# Patient Record
Sex: MALE | Race: WHITE | NOT HISPANIC OR LATINO
[De-identification: names, ages, dates, MRNs, and addresses within clinical notes are randomized per-mention and may not be internally consistent; named-entity substitution may affect disease eponyms.]

---

## 2017-01-30 ENCOUNTER — APPOINTMENT (OUTPATIENT)
Dept: CARDIOTHORACIC SURGERY | Facility: CLINIC | Age: 77
End: 2017-01-30

## 2017-01-30 ENCOUNTER — OUTPATIENT (OUTPATIENT)
Dept: OUTPATIENT SERVICES | Facility: HOSPITAL | Age: 77
LOS: 1 days | End: 2017-01-30
Payer: MEDICARE

## 2017-01-30 VITALS
WEIGHT: 185 LBS | BODY MASS INDEX: 26.48 KG/M2 | HEIGHT: 70 IN | SYSTOLIC BLOOD PRESSURE: 141 MMHG | TEMPERATURE: 97.8 F | OXYGEN SATURATION: 96 % | RESPIRATION RATE: 17 BRPM | HEART RATE: 62 BPM | DIASTOLIC BLOOD PRESSURE: 69 MMHG

## 2017-01-30 DIAGNOSIS — I25.5 ISCHEMIC CARDIOMYOPATHY: ICD-10-CM

## 2017-01-30 PROCEDURE — 93306 TTE W/DOPPLER COMPLETE: CPT

## 2017-02-09 ENCOUNTER — APPOINTMENT (OUTPATIENT)
Dept: HEART AND VASCULAR | Facility: CLINIC | Age: 77
End: 2017-02-09

## 2017-02-21 ENCOUNTER — MEDICATION RENEWAL (OUTPATIENT)
Age: 77
End: 2017-02-21

## 2017-03-09 ENCOUNTER — APPOINTMENT (OUTPATIENT)
Dept: HEART AND VASCULAR | Facility: CLINIC | Age: 77
End: 2017-03-09

## 2017-03-09 VITALS
SYSTOLIC BLOOD PRESSURE: 176 MMHG | WEIGHT: 185 LBS | HEIGHT: 70 IN | HEART RATE: 60 BPM | DIASTOLIC BLOOD PRESSURE: 77 MMHG | BODY MASS INDEX: 26.48 KG/M2

## 2017-03-20 ENCOUNTER — MEDICATION RENEWAL (OUTPATIENT)
Age: 77
End: 2017-03-20

## 2017-04-13 ENCOUNTER — MEDICATION RENEWAL (OUTPATIENT)
Age: 77
End: 2017-04-13

## 2017-04-17 ENCOUNTER — MEDICATION RENEWAL (OUTPATIENT)
Age: 77
End: 2017-04-17

## 2017-04-18 ENCOUNTER — MEDICATION RENEWAL (OUTPATIENT)
Age: 77
End: 2017-04-18

## 2017-06-06 ENCOUNTER — MEDICATION RENEWAL (OUTPATIENT)
Age: 77
End: 2017-06-06

## 2017-06-07 ENCOUNTER — MEDICATION RENEWAL (OUTPATIENT)
Age: 77
End: 2017-06-07

## 2017-06-29 ENCOUNTER — APPOINTMENT (OUTPATIENT)
Dept: HEART AND VASCULAR | Facility: CLINIC | Age: 77
End: 2017-06-29

## 2017-06-29 VITALS
OXYGEN SATURATION: 96 % | HEIGHT: 70 IN | BODY MASS INDEX: 26.77 KG/M2 | TEMPERATURE: 97.9 F | HEART RATE: 60 BPM | WEIGHT: 187 LBS | DIASTOLIC BLOOD PRESSURE: 70 MMHG | SYSTOLIC BLOOD PRESSURE: 132 MMHG

## 2017-06-30 LAB
25(OH)D3 SERPL-MCNC: 59.5 NG/ML
ALBUMIN SERPL ELPH-MCNC: 4.3 G/DL
ALP BLD-CCNC: 55 U/L
ALT SERPL-CCNC: 10 U/L
ANION GAP SERPL CALC-SCNC: 14 MMOL/L
AST SERPL-CCNC: 39 U/L
BASOPHILS # BLD AUTO: 0.02 K/UL
BASOPHILS NFR BLD AUTO: 0.3 %
BILIRUB SERPL-MCNC: 0.4 MG/DL
BUN SERPL-MCNC: 23 MG/DL
CALCIUM SERPL-MCNC: 10.1 MG/DL
CHLORIDE SERPL-SCNC: 102 MMOL/L
CHOLEST SERPL-MCNC: 242 MG/DL
CHOLEST/HDLC SERPL: 4.4 RATIO
CO2 SERPL-SCNC: 25 MMOL/L
CREAT SERPL-MCNC: 1.41 MG/DL
EOSINOPHIL # BLD AUTO: 0.29 K/UL
EOSINOPHIL NFR BLD AUTO: 4.4 %
GLUCOSE SERPL-MCNC: 104 MG/DL
HBA1C MFR BLD HPLC: 5.5 %
HCT VFR BLD CALC: 39.2 %
HDLC SERPL-MCNC: 55 MG/DL
HGB BLD-MCNC: 12.3 G/DL
IMM GRANULOCYTES NFR BLD AUTO: 0.3 %
LDLC SERPL CALC-MCNC: 157 MG/DL
LYMPHOCYTES # BLD AUTO: 1.03 K/UL
LYMPHOCYTES NFR BLD AUTO: 15.5 %
MAN DIFF?: NORMAL
MCHC RBC-ENTMCNC: 31.4 GM/DL
MCHC RBC-ENTMCNC: 32.1 PG
MCV RBC AUTO: 102.3 FL
MONOCYTES # BLD AUTO: 0.44 K/UL
MONOCYTES NFR BLD AUTO: 6.6 %
NEUTROPHILS # BLD AUTO: 4.84 K/UL
NEUTROPHILS NFR BLD AUTO: 72.9 %
NT-PROBNP SERPL-MCNC: 492 PG/ML
PLATELET # BLD AUTO: 234 K/UL
POTASSIUM SERPL-SCNC: 5.1 MMOL/L
PROT SERPL-MCNC: 6.9 G/DL
RBC # BLD: 3.83 M/UL
RBC # FLD: 14 %
SODIUM SERPL-SCNC: 141 MMOL/L
TRIGL SERPL-MCNC: 151 MG/DL
TSH SERPL-ACNC: 1.09 UIU/ML
URATE SERPL-MCNC: 7.9 MG/DL
WBC # FLD AUTO: 6.64 K/UL

## 2017-07-13 ENCOUNTER — APPOINTMENT (OUTPATIENT)
Dept: HEART AND VASCULAR | Facility: CLINIC | Age: 77
End: 2017-07-13

## 2017-07-24 ENCOUNTER — APPOINTMENT (OUTPATIENT)
Dept: NEPHROLOGY | Facility: CLINIC | Age: 77
End: 2017-07-24

## 2017-07-24 VITALS
OXYGEN SATURATION: 95 % | BODY MASS INDEX: 26.54 KG/M2 | WEIGHT: 185 LBS | SYSTOLIC BLOOD PRESSURE: 120 MMHG | TEMPERATURE: 98.7 F | RESPIRATION RATE: 12 BRPM | DIASTOLIC BLOOD PRESSURE: 72 MMHG | HEART RATE: 60 BPM

## 2017-07-25 LAB
25(OH)D3 SERPL-MCNC: 67.2 NG/ML
ALBUMIN SERPL ELPH-MCNC: 4.5 G/DL
ALP BLD-CCNC: 57 U/L
ALT SERPL-CCNC: 11 U/L
ANION GAP SERPL CALC-SCNC: 16 MMOL/L
APPEARANCE: CLEAR
AST SERPL-CCNC: 48 U/L
BACTERIA: NEGATIVE
BILIRUB SERPL-MCNC: 0.6 MG/DL
BILIRUBIN URINE: NEGATIVE
BLOOD URINE: NEGATIVE
BUN SERPL-MCNC: 19 MG/DL
CALCIUM SERPL-MCNC: 10.3 MG/DL
CHLORIDE SERPL-SCNC: 104 MMOL/L
CO2 SERPL-SCNC: 24 MMOL/L
COLOR: ABNORMAL
CREAT SERPL-MCNC: 1.32 MG/DL
CREAT SPEC-SCNC: 244 MG/DL
CREAT/PROT UR: 0.5 RATIO
GLUCOSE QUALITATIVE U: NORMAL MG/DL
GLUCOSE SERPL-MCNC: 97 MG/DL
HYALINE CASTS: 10 /LPF
KETONES URINE: NEGATIVE
LEUKOCYTE ESTERASE URINE: NEGATIVE
MAGNESIUM SERPL-MCNC: 2.2 MG/DL
MICROSCOPIC-UA: NORMAL
NITRITE URINE: NEGATIVE
PH URINE: 6
PHOSPHATE SERPL-MCNC: 3.1 MG/DL
POTASSIUM SERPL-SCNC: 4.6 MMOL/L
PROT SERPL-MCNC: 6.9 G/DL
PROT UR-MCNC: 118 MG/DL
PROTEIN URINE: 100 MG/DL
RED BLOOD CELLS URINE: 3 /HPF
SODIUM SERPL-SCNC: 144 MMOL/L
SPECIFIC GRAVITY URINE: 1.02
SQUAMOUS EPITHELIAL CELLS: 3 /HPF
URATE SERPL-MCNC: 7.6 MG/DL
UROBILINOGEN URINE: 1 MG/DL
WHITE BLOOD CELLS URINE: 3 /HPF

## 2017-07-26 LAB
CALCIUM SERPL-MCNC: 10.3 MG/DL
PARATHYROID HORMONE INTACT: 23 PG/ML

## 2017-08-29 ENCOUNTER — TRANSCRIPTION ENCOUNTER (OUTPATIENT)
Age: 77
End: 2017-08-29

## 2017-08-31 ENCOUNTER — APPOINTMENT (OUTPATIENT)
Dept: HEART AND VASCULAR | Facility: CLINIC | Age: 77
End: 2017-08-31
Payer: MEDICARE

## 2017-08-31 VITALS
TEMPERATURE: 98.6 F | RESPIRATION RATE: 14 BRPM | SYSTOLIC BLOOD PRESSURE: 132 MMHG | OXYGEN SATURATION: 97 % | BODY MASS INDEX: 25.48 KG/M2 | HEIGHT: 70 IN | WEIGHT: 178 LBS | HEART RATE: 60 BPM | DIASTOLIC BLOOD PRESSURE: 78 MMHG

## 2017-08-31 VITALS — SYSTOLIC BLOOD PRESSURE: 136 MMHG | DIASTOLIC BLOOD PRESSURE: 68 MMHG

## 2017-08-31 VITALS — DIASTOLIC BLOOD PRESSURE: 62 MMHG | SYSTOLIC BLOOD PRESSURE: 120 MMHG

## 2017-08-31 PROCEDURE — 99214 OFFICE O/P EST MOD 30 MIN: CPT

## 2017-08-31 RX ORDER — HYDROCODONE BITARTRATE AND ACETAMINOPHEN 10; 325 MG/1; MG/1
10-325 TABLET ORAL
Qty: 60 | Refills: 0 | Status: ACTIVE | COMMUNITY
Start: 2017-08-07

## 2017-09-05 ENCOUNTER — MEDICATION RENEWAL (OUTPATIENT)
Age: 77
End: 2017-09-05

## 2017-10-03 ENCOUNTER — TRANSCRIPTION ENCOUNTER (OUTPATIENT)
Age: 77
End: 2017-10-03

## 2017-10-04 ENCOUNTER — APPOINTMENT (OUTPATIENT)
Dept: HEART AND VASCULAR | Facility: CLINIC | Age: 77
End: 2017-10-04
Payer: MEDICARE

## 2017-10-04 ENCOUNTER — LABORATORY RESULT (OUTPATIENT)
Age: 77
End: 2017-10-04

## 2017-10-04 VITALS
HEIGHT: 70 IN | HEART RATE: 60 BPM | OXYGEN SATURATION: 97 % | RESPIRATION RATE: 14 BRPM | SYSTOLIC BLOOD PRESSURE: 104 MMHG | TEMPERATURE: 98.1 F | WEIGHT: 176 LBS | DIASTOLIC BLOOD PRESSURE: 68 MMHG | BODY MASS INDEX: 25.2 KG/M2

## 2017-10-04 DIAGNOSIS — M54.2 CERVICALGIA: ICD-10-CM

## 2017-10-04 DIAGNOSIS — G89.29 CERVICALGIA: ICD-10-CM

## 2017-10-04 PROCEDURE — 36415 COLL VENOUS BLD VENIPUNCTURE: CPT

## 2017-10-04 PROCEDURE — 93000 ELECTROCARDIOGRAM COMPLETE: CPT

## 2017-10-04 PROCEDURE — 99214 OFFICE O/P EST MOD 30 MIN: CPT | Mod: 25

## 2017-10-04 RX ORDER — LEVALBUTEROL TARTRATE 45 UG/1
45 AEROSOL, METERED ORAL
Qty: 15 | Refills: 0 | Status: ACTIVE | COMMUNITY
Start: 2017-06-29 | End: 1900-01-01

## 2017-10-05 LAB
ALBUMIN SERPL ELPH-MCNC: 4.8 G/DL
ALP BLD-CCNC: 54 U/L
ALT SERPL-CCNC: 15 U/L
ANION GAP SERPL CALC-SCNC: 13 MMOL/L
APTT BLD: 28.6 SEC
AST SERPL-CCNC: 57 U/L
BASOPHILS # BLD AUTO: 0.02 K/UL
BASOPHILS NFR BLD AUTO: 0.3 %
BILIRUB SERPL-MCNC: 0.7 MG/DL
BUN SERPL-MCNC: 24 MG/DL
CALCIUM SERPL-MCNC: 9.9 MG/DL
CHLORIDE SERPL-SCNC: 101 MMOL/L
CO2 SERPL-SCNC: 30 MMOL/L
CREAT SERPL-MCNC: 1.4 MG/DL
EOSINOPHIL # BLD AUTO: 0.16 K/UL
EOSINOPHIL NFR BLD AUTO: 2.3 %
GLUCOSE SERPL-MCNC: 107 MG/DL
HCT VFR BLD CALC: 39.6 %
HGB BLD-MCNC: 12.7 G/DL
IMM GRANULOCYTES NFR BLD AUTO: 0.1 %
INR PPP: 0.96 RATIO
LYMPHOCYTES # BLD AUTO: 1.18 K/UL
LYMPHOCYTES NFR BLD AUTO: 16.7 %
MAN DIFF?: NORMAL
MCHC RBC-ENTMCNC: 32.1 GM/DL
MCHC RBC-ENTMCNC: 32.2 PG
MCV RBC AUTO: 100.3 FL
MONOCYTES # BLD AUTO: 0.51 K/UL
MONOCYTES NFR BLD AUTO: 7.2 %
NEUTROPHILS # BLD AUTO: 5.2 K/UL
NEUTROPHILS NFR BLD AUTO: 73.4 %
PLATELET # BLD AUTO: 221 K/UL
POTASSIUM SERPL-SCNC: 4.7 MMOL/L
PROT SERPL-MCNC: 7.5 G/DL
PT BLD: 10.8 SEC
RBC # BLD: 3.95 M/UL
RBC # FLD: 13.4 %
SODIUM SERPL-SCNC: 144 MMOL/L
WBC # FLD AUTO: 7.08 K/UL

## 2017-10-09 ENCOUNTER — TRANSCRIPTION ENCOUNTER (OUTPATIENT)
Age: 77
End: 2017-10-09

## 2017-11-20 ENCOUNTER — TRANSCRIPTION ENCOUNTER (OUTPATIENT)
Age: 77
End: 2017-11-20

## 2017-11-21 ENCOUNTER — APPOINTMENT (OUTPATIENT)
Dept: HEART AND VASCULAR | Facility: CLINIC | Age: 77
End: 2017-11-21
Payer: MEDICARE

## 2017-11-21 VITALS
DIASTOLIC BLOOD PRESSURE: 58 MMHG | SYSTOLIC BLOOD PRESSURE: 100 MMHG | BODY MASS INDEX: 25.62 KG/M2 | OXYGEN SATURATION: 95 % | WEIGHT: 179 LBS | RESPIRATION RATE: 14 BRPM | TEMPERATURE: 98.1 F | HEIGHT: 70 IN | HEART RATE: 60 BPM

## 2017-11-21 PROCEDURE — 90471 IMMUNIZATION ADMIN: CPT

## 2017-11-21 PROCEDURE — 90662 IIV NO PRSV INCREASED AG IM: CPT

## 2018-02-02 ENCOUNTER — TRANSCRIPTION ENCOUNTER (OUTPATIENT)
Age: 78
End: 2018-02-02

## 2018-02-09 ENCOUNTER — APPOINTMENT (OUTPATIENT)
Dept: INTERNAL MEDICINE | Facility: CLINIC | Age: 78
End: 2018-02-09
Payer: MEDICARE

## 2018-02-09 ENCOUNTER — LABORATORY RESULT (OUTPATIENT)
Age: 78
End: 2018-02-09

## 2018-02-09 VITALS
SYSTOLIC BLOOD PRESSURE: 118 MMHG | HEIGHT: 70 IN | WEIGHT: 179 LBS | TEMPERATURE: 98.2 F | BODY MASS INDEX: 25.62 KG/M2 | OXYGEN SATURATION: 95 % | HEART RATE: 60 BPM | DIASTOLIC BLOOD PRESSURE: 80 MMHG | RESPIRATION RATE: 14 BRPM

## 2018-02-09 PROCEDURE — 99214 OFFICE O/P EST MOD 30 MIN: CPT | Mod: 25

## 2018-02-09 PROCEDURE — 36415 COLL VENOUS BLD VENIPUNCTURE: CPT

## 2018-02-11 LAB
ANION GAP SERPL CALC-SCNC: 12 MMOL/L
APPEARANCE: CLEAR
BILIRUBIN URINE: NEGATIVE
BLOOD URINE: NEGATIVE
BUN SERPL-MCNC: 17 MG/DL
CALCIUM SERPL-MCNC: 9.6 MG/DL
CHLORIDE SERPL-SCNC: 105 MMOL/L
CO2 SERPL-SCNC: 28 MMOL/L
COLOR: YELLOW
CREAT SERPL-MCNC: 1.15 MG/DL
CREAT SPEC-SCNC: 191 MG/DL
CREAT/PROT UR: 0.5 RATIO
GLUCOSE QUALITATIVE U: NEGATIVE
GLUCOSE SERPL-MCNC: 122 MG/DL
KETONES URINE: ABNORMAL
LEUKOCYTE ESTERASE URINE: NEGATIVE
MAGNESIUM SERPL-MCNC: 2.2 MG/DL
NITRITE URINE: NEGATIVE
PH URINE: 6
PHOSPHATE SERPL-MCNC: 2.7 MG/DL
POTASSIUM SERPL-SCNC: 4.6 MMOL/L
PROT UR-MCNC: 100 MG/DL
PROTEIN URINE: 100
SODIUM SERPL-SCNC: 145 MMOL/L
SPECIFIC GRAVITY URINE: 1.02
URATE SERPL-MCNC: 6.5 MG/DL
UROBILINOGEN URINE: 1

## 2018-03-01 ENCOUNTER — MEDICATION RENEWAL (OUTPATIENT)
Age: 78
End: 2018-03-01

## 2018-03-06 ENCOUNTER — APPOINTMENT (OUTPATIENT)
Dept: HEART AND VASCULAR | Facility: CLINIC | Age: 78
End: 2018-03-06
Payer: MEDICARE

## 2018-03-06 VITALS
HEIGHT: 70 IN | BODY MASS INDEX: 26.05 KG/M2 | SYSTOLIC BLOOD PRESSURE: 110 MMHG | TEMPERATURE: 98.2 F | DIASTOLIC BLOOD PRESSURE: 62 MMHG | RESPIRATION RATE: 14 BRPM | OXYGEN SATURATION: 97 % | HEART RATE: 60 BPM | WEIGHT: 182 LBS

## 2018-03-06 DIAGNOSIS — Z01.810 ENCOUNTER FOR PREPROCEDURAL CARDIOVASCULAR EXAMINATION: ICD-10-CM

## 2018-03-06 PROCEDURE — G0009: CPT

## 2018-03-06 PROCEDURE — G0439: CPT

## 2018-03-06 PROCEDURE — 90732 PPSV23 VACC 2 YRS+ SUBQ/IM: CPT

## 2018-03-06 PROCEDURE — 93000 ELECTROCARDIOGRAM COMPLETE: CPT

## 2018-03-06 RX ORDER — GABAPENTIN 400 MG/1
400 CAPSULE ORAL
Qty: 90 | Refills: 0 | Status: DISCONTINUED | COMMUNITY
Start: 2017-11-30 | End: 2018-03-06

## 2018-04-19 ENCOUNTER — APPOINTMENT (OUTPATIENT)
Dept: HEART AND VASCULAR | Facility: CLINIC | Age: 78
End: 2018-04-19
Payer: MEDICARE

## 2018-04-19 VITALS
BODY MASS INDEX: 25.91 KG/M2 | WEIGHT: 181 LBS | HEART RATE: 60 BPM | SYSTOLIC BLOOD PRESSURE: 116 MMHG | HEIGHT: 70 IN | DIASTOLIC BLOOD PRESSURE: 58 MMHG

## 2018-04-19 PROCEDURE — 93283 PRGRMG EVAL IMPLANTABLE DFB: CPT

## 2018-06-05 ENCOUNTER — MEDICATION RENEWAL (OUTPATIENT)
Age: 78
End: 2018-06-05

## 2018-08-29 ENCOUNTER — MEDICATION RENEWAL (OUTPATIENT)
Age: 78
End: 2018-08-29

## 2018-09-25 ENCOUNTER — APPOINTMENT (OUTPATIENT)
Dept: HEART AND VASCULAR | Facility: CLINIC | Age: 78
End: 2018-09-25
Payer: MEDICARE

## 2018-09-25 VITALS
WEIGHT: 178 LBS | SYSTOLIC BLOOD PRESSURE: 110 MMHG | OXYGEN SATURATION: 95 % | DIASTOLIC BLOOD PRESSURE: 60 MMHG | HEIGHT: 70 IN | BODY MASS INDEX: 25.48 KG/M2 | TEMPERATURE: 97.9 F | HEART RATE: 61 BPM

## 2018-09-25 DIAGNOSIS — R06.09 OTHER FORMS OF DYSPNEA: ICD-10-CM

## 2018-09-25 PROCEDURE — 90662 IIV NO PRSV INCREASED AG IM: CPT

## 2018-09-25 PROCEDURE — 99214 OFFICE O/P EST MOD 30 MIN: CPT

## 2018-09-25 PROCEDURE — G0008: CPT

## 2018-09-25 PROCEDURE — 36415 COLL VENOUS BLD VENIPUNCTURE: CPT

## 2018-09-25 PROCEDURE — 93306 TTE W/DOPPLER COMPLETE: CPT

## 2018-09-25 PROCEDURE — 93000 ELECTROCARDIOGRAM COMPLETE: CPT

## 2018-10-04 ENCOUNTER — APPOINTMENT (OUTPATIENT)
Dept: INTERNAL MEDICINE | Facility: CLINIC | Age: 78
End: 2018-10-04
Payer: MEDICARE

## 2018-10-04 VITALS
HEIGHT: 70 IN | BODY MASS INDEX: 25.62 KG/M2 | TEMPERATURE: 98.7 F | HEART RATE: 60 BPM | OXYGEN SATURATION: 95 % | DIASTOLIC BLOOD PRESSURE: 70 MMHG | WEIGHT: 179 LBS | SYSTOLIC BLOOD PRESSURE: 90 MMHG

## 2018-10-04 PROCEDURE — 99214 OFFICE O/P EST MOD 30 MIN: CPT

## 2018-10-04 PROCEDURE — 36415 COLL VENOUS BLD VENIPUNCTURE: CPT

## 2018-10-08 LAB
ALBUMIN SERPL ELPH-MCNC: 4.4 G/DL
ALP BLD-CCNC: 56 U/L
ALT SERPL-CCNC: 10 U/L
ANION GAP SERPL CALC-SCNC: 13 MMOL/L
APPEARANCE: CLEAR
AST SERPL-CCNC: 41 U/L
BASOPHILS # BLD AUTO: 0.02 K/UL
BASOPHILS NFR BLD AUTO: 0.3 %
BILIRUB SERPL-MCNC: 0.4 MG/DL
BILIRUBIN URINE: NEGATIVE
BLOOD URINE: NEGATIVE
BUN SERPL-MCNC: 17 MG/DL
CALCIUM SERPL-MCNC: 10 MG/DL
CHLORIDE SERPL-SCNC: 104 MMOL/L
CHOLEST SERPL-MCNC: 223 MG/DL
CHOLEST/HDLC SERPL: 4.8 RATIO
CO2 SERPL-SCNC: 27 MMOL/L
COLOR: ABNORMAL
CREAT SERPL-MCNC: 1.22 MG/DL
CREAT SPEC-SCNC: 211 MG/DL
CREAT/PROT UR: 0.8 RATIO
EOSINOPHIL # BLD AUTO: 0.26 K/UL
EOSINOPHIL NFR BLD AUTO: 4.1 %
GLUCOSE QUALITATIVE U: NEGATIVE MG/DL
GLUCOSE SERPL-MCNC: 107 MG/DL
HBA1C MFR BLD HPLC: 5.4 %
HCT VFR BLD CALC: 38.7 %
HDLC SERPL-MCNC: 46 MG/DL
HGB BLD-MCNC: 12.3 G/DL
IMM GRANULOCYTES NFR BLD AUTO: 0.5 %
KETONES URINE: ABNORMAL
LDLC SERPL CALC-MCNC: 144 MG/DL
LEUKOCYTE ESTERASE URINE: NEGATIVE
LYMPHOCYTES # BLD AUTO: 0.99 K/UL
LYMPHOCYTES NFR BLD AUTO: 15.7 %
MAN DIFF?: NORMAL
MCHC RBC-ENTMCNC: 31.8 GM/DL
MCHC RBC-ENTMCNC: 32.4 PG
MCV RBC AUTO: 101.8 FL
MONOCYTES # BLD AUTO: 0.39 K/UL
MONOCYTES NFR BLD AUTO: 6.2 %
NEUTROPHILS # BLD AUTO: 4.63 K/UL
NEUTROPHILS NFR BLD AUTO: 73.2 %
NITRITE URINE: NEGATIVE
NT-PROBNP SERPL-MCNC: 840 PG/ML
PH URINE: 5.5
PLATELET # BLD AUTO: 238 K/UL
POTASSIUM SERPL-SCNC: 5 MMOL/L
PROT SERPL-MCNC: 6.9 G/DL
PROT UR-MCNC: 160 MG/DL
PROTEIN URINE: 100 MG/DL
RBC # BLD: 3.8 M/UL
RBC # FLD: 14.1 %
SODIUM SERPL-SCNC: 144 MMOL/L
SPECIFIC GRAVITY URINE: 1.02
TRIGL SERPL-MCNC: 163 MG/DL
UROBILINOGEN URINE: NEGATIVE MG/DL
WBC # FLD AUTO: 6.32 K/UL

## 2018-10-23 ENCOUNTER — APPOINTMENT (OUTPATIENT)
Dept: PULMONOLOGY | Facility: CLINIC | Age: 78
End: 2018-10-23
Payer: MEDICARE

## 2018-10-23 VITALS
BODY MASS INDEX: 25.62 KG/M2 | HEIGHT: 70 IN | HEART RATE: 60 BPM | RESPIRATION RATE: 16 BRPM | DIASTOLIC BLOOD PRESSURE: 60 MMHG | TEMPERATURE: 97.1 F | SYSTOLIC BLOOD PRESSURE: 110 MMHG | OXYGEN SATURATION: 97 % | WEIGHT: 179 LBS

## 2018-10-23 PROCEDURE — 71046 X-RAY EXAM CHEST 2 VIEWS: CPT

## 2018-10-23 PROCEDURE — 94010 BREATHING CAPACITY TEST: CPT

## 2018-10-23 PROCEDURE — 95012 NITRIC OXIDE EXP GAS DETER: CPT

## 2018-10-23 PROCEDURE — 99204 OFFICE O/P NEW MOD 45 MIN: CPT | Mod: 25

## 2018-11-26 ENCOUNTER — APPOINTMENT (OUTPATIENT)
Dept: PULMONOLOGY | Facility: CLINIC | Age: 78
End: 2018-11-26

## 2018-11-28 ENCOUNTER — APPOINTMENT (OUTPATIENT)
Dept: PULMONOLOGY | Facility: CLINIC | Age: 78
End: 2018-11-28
Payer: MEDICARE

## 2018-11-28 VITALS
HEART RATE: 73 BPM | SYSTOLIC BLOOD PRESSURE: 102 MMHG | BODY MASS INDEX: 25.77 KG/M2 | WEIGHT: 180 LBS | HEIGHT: 70 IN | OXYGEN SATURATION: 98 % | DIASTOLIC BLOOD PRESSURE: 66 MMHG | TEMPERATURE: 98.2 F

## 2018-11-28 VITALS
OXYGEN SATURATION: 95 % | SYSTOLIC BLOOD PRESSURE: 128 MMHG | BODY MASS INDEX: 25.77 KG/M2 | HEART RATE: 61 BPM | TEMPERATURE: 96.8 F | DIASTOLIC BLOOD PRESSURE: 70 MMHG | HEIGHT: 70 IN | WEIGHT: 180 LBS

## 2018-11-28 PROCEDURE — 94060 EVALUATION OF WHEEZING: CPT

## 2018-11-28 PROCEDURE — 94729 DIFFUSING CAPACITY: CPT

## 2018-11-28 PROCEDURE — 94727 GAS DIL/WSHOT DETER LNG VOL: CPT

## 2018-11-28 PROCEDURE — 99213 OFFICE O/P EST LOW 20 MIN: CPT | Mod: 25

## 2018-12-21 ENCOUNTER — TRANSCRIPTION ENCOUNTER (OUTPATIENT)
Age: 78
End: 2018-12-21

## 2018-12-24 ENCOUNTER — MEDICATION RENEWAL (OUTPATIENT)
Age: 78
End: 2018-12-24

## 2018-12-24 ENCOUNTER — TRANSCRIPTION ENCOUNTER (OUTPATIENT)
Age: 78
End: 2018-12-24

## 2019-01-29 ENCOUNTER — APPOINTMENT (OUTPATIENT)
Dept: HEART AND VASCULAR | Facility: CLINIC | Age: 79
End: 2019-01-29
Payer: MEDICARE

## 2019-01-29 VITALS — SYSTOLIC BLOOD PRESSURE: 164 MMHG | DIASTOLIC BLOOD PRESSURE: 80 MMHG

## 2019-01-29 VITALS
DIASTOLIC BLOOD PRESSURE: 64 MMHG | BODY MASS INDEX: 26.05 KG/M2 | WEIGHT: 182 LBS | OXYGEN SATURATION: 97 % | RESPIRATION RATE: 14 BRPM | SYSTOLIC BLOOD PRESSURE: 160 MMHG | TEMPERATURE: 97.8 F | HEIGHT: 70 IN | HEART RATE: 60 BPM

## 2019-01-29 PROCEDURE — 99214 OFFICE O/P EST MOD 30 MIN: CPT

## 2019-01-29 RX ORDER — FINASTERIDE 5 MG/1
5 TABLET, FILM COATED ORAL DAILY
Qty: 30 | Refills: 2 | Status: ACTIVE | COMMUNITY
Start: 2019-01-29

## 2019-01-29 NOTE — DISCUSSION/SUMMARY
[FreeTextEntry1] : Hypertension--I asked him to increase Enalapril to 20 mg bid and get repeat BP close to home and then followup in [place]

## 2019-03-11 ENCOUNTER — RX RENEWAL (OUTPATIENT)
Age: 79
End: 2019-03-11

## 2019-03-19 ENCOUNTER — CLINICAL ADVICE (OUTPATIENT)
Age: 79
End: 2019-03-19

## 2019-03-20 ENCOUNTER — APPOINTMENT (OUTPATIENT)
Dept: HEART AND VASCULAR | Facility: CLINIC | Age: 79
End: 2019-03-20
Payer: MEDICARE

## 2019-03-20 VITALS
HEIGHT: 67.25 IN | DIASTOLIC BLOOD PRESSURE: 74 MMHG | WEIGHT: 187 LBS | TEMPERATURE: 98.1 F | BODY MASS INDEX: 29.01 KG/M2 | SYSTOLIC BLOOD PRESSURE: 150 MMHG | HEART RATE: 60 BPM | OXYGEN SATURATION: 96 % | RESPIRATION RATE: 14 BRPM

## 2019-03-20 VITALS — SYSTOLIC BLOOD PRESSURE: 140 MMHG | DIASTOLIC BLOOD PRESSURE: 66 MMHG

## 2019-03-20 PROCEDURE — 99214 OFFICE O/P EST MOD 30 MIN: CPT

## 2019-03-20 PROCEDURE — 93000 ELECTROCARDIOGRAM COMPLETE: CPT

## 2019-03-20 PROCEDURE — 36415 COLL VENOUS BLD VENIPUNCTURE: CPT

## 2019-03-20 NOTE — PHYSICAL EXAM
[General Appearance - Well Developed] : well developed [Normal Appearance] : normal appearance [Well Groomed] : well groomed [General Appearance - Well Nourished] : well nourished [No Deformities] : no deformities [Normal Conjunctiva] : the conjunctiva exhibited no abnormalities [General Appearance - In No Acute Distress] : no acute distress [] : no respiratory distress [Exaggerated Use Of Accessory Muscles For Inspiration] : no accessory muscle use [Respiration, Rhythm And Depth] : normal respiratory rhythm and effort [Auscultation Breath Sounds / Voice Sounds] : lungs were clear to auscultation bilaterally [Heart Sounds] : normal S1 and S2 [Abdomen Soft] : soft [Skin Turgor] : normal skin turgor [Abnormal Walk] : normal gait [Oriented To Time, Place, And Person] : oriented to person, place, and time [Affect] : the affect was normal [Mood] : the mood was normal [No Anxiety] : not feeling anxious

## 2019-03-20 NOTE — HISTORY OF PRESENT ILLNESS
[FreeTextEntry1] : 78 year male who ate a corned beef sandwiches on March 16 and March 17. He has noted a rise in his BP associated to 172/86 on March 18. He notes not being able to cook for himself. He stays away from salty foods most of the time. He often has soup. He has to be careful because of his esophagus.

## 2019-03-21 LAB
25(OH)D3 SERPL-MCNC: 65.7 NG/ML
ALBUMIN SERPL ELPH-MCNC: 4.3 G/DL
ALP BLD-CCNC: 70 U/L
ALT SERPL-CCNC: 13 U/L
ANION GAP SERPL CALC-SCNC: 12 MMOL/L
AST SERPL-CCNC: 47 U/L
BASOPHILS # BLD AUTO: 0.04 K/UL
BASOPHILS NFR BLD AUTO: 0.5 %
BILIRUB SERPL-MCNC: 0.2 MG/DL
BUN SERPL-MCNC: 19 MG/DL
CALCIUM SERPL-MCNC: 9.9 MG/DL
CHLORIDE SERPL-SCNC: 103 MMOL/L
CHOLEST SERPL-MCNC: 249 MG/DL
CHOLEST/HDLC SERPL: 5.8 RATIO
CO2 SERPL-SCNC: 27 MMOL/L
CREAT SERPL-MCNC: 1.24 MG/DL
EOSINOPHIL # BLD AUTO: 0.23 K/UL
EOSINOPHIL NFR BLD AUTO: 2.8 %
GLUCOSE SERPL-MCNC: 100 MG/DL
HBA1C MFR BLD HPLC: 5.5 %
HCT VFR BLD CALC: 38.8 %
HDLC SERPL-MCNC: 43 MG/DL
HGB BLD-MCNC: 12.1 G/DL
IMM GRANULOCYTES NFR BLD AUTO: 0.6 %
LDLC SERPL CALC-MCNC: 145 MG/DL
LYMPHOCYTES # BLD AUTO: 1.08 K/UL
LYMPHOCYTES NFR BLD AUTO: 13.2 %
MAN DIFF?: NORMAL
MCHC RBC-ENTMCNC: 31.2 GM/DL
MCHC RBC-ENTMCNC: 31.8 PG
MCV RBC AUTO: 102.1 FL
MONOCYTES # BLD AUTO: 0.63 K/UL
MONOCYTES NFR BLD AUTO: 7.7 %
NEUTROPHILS # BLD AUTO: 6.16 K/UL
NEUTROPHILS NFR BLD AUTO: 75.2 %
NT-PROBNP SERPL-MCNC: 574 PG/ML
PLATELET # BLD AUTO: 234 K/UL
POTASSIUM SERPL-SCNC: 4.7 MMOL/L
PROT SERPL-MCNC: 6.9 G/DL
RBC # BLD: 3.8 M/UL
RBC # FLD: 13 %
SODIUM SERPL-SCNC: 142 MMOL/L
TRIGL SERPL-MCNC: 307 MG/DL
TSH SERPL-ACNC: 1.25 UIU/ML
WBC # FLD AUTO: 8.19 K/UL

## 2019-04-18 ENCOUNTER — APPOINTMENT (OUTPATIENT)
Dept: HEART AND VASCULAR | Facility: CLINIC | Age: 79
End: 2019-04-18
Payer: MEDICARE

## 2019-04-18 VITALS — SYSTOLIC BLOOD PRESSURE: 114 MMHG | DIASTOLIC BLOOD PRESSURE: 70 MMHG

## 2019-04-18 PROCEDURE — 99214 OFFICE O/P EST MOD 30 MIN: CPT

## 2019-04-18 NOTE — HISTORY OF PRESENT ILLNESS
[FreeTextEntry1] : 78 year male who is seeing pain management and is planning epidurals. He brought a diary of his morning BP and just prior to bed. He is avoiding salt but is talking his diuretic intermittently

## 2019-04-18 NOTE — PHYSICAL EXAM
[Normal Appearance] : normal appearance [General Appearance - Well Developed] : well developed [No Deformities] : no deformities [Well Groomed] : well groomed [General Appearance - Well Nourished] : well nourished [Normal Conjunctiva] : the conjunctiva exhibited no abnormalities [General Appearance - In No Acute Distress] : no acute distress [Exaggerated Use Of Accessory Muscles For Inspiration] : no accessory muscle use [] : no respiratory distress [Respiration, Rhythm And Depth] : normal respiratory rhythm and effort [Auscultation Breath Sounds / Voice Sounds] : lungs were clear to auscultation bilaterally [Heart Sounds] : normal S1 and S2 [Abnormal Walk] : normal gait [Skin Turgor] : normal skin turgor [Oriented To Time, Place, And Person] : oriented to person, place, and time [Affect] : the affect was normal [Mood] : the mood was normal [No Anxiety] : not feeling anxious

## 2019-05-29 ENCOUNTER — TRANSCRIPTION ENCOUNTER (OUTPATIENT)
Age: 79
End: 2019-05-29

## 2019-05-29 ENCOUNTER — APPOINTMENT (OUTPATIENT)
Dept: HEART AND VASCULAR | Facility: CLINIC | Age: 79
End: 2019-05-29
Payer: MEDICARE

## 2019-05-29 VITALS
DIASTOLIC BLOOD PRESSURE: 68 MMHG | WEIGHT: 189 LBS | BODY MASS INDEX: 29.32 KG/M2 | SYSTOLIC BLOOD PRESSURE: 124 MMHG | HEIGHT: 67.25 IN | RESPIRATION RATE: 14 BRPM | TEMPERATURE: 97.7 F | OXYGEN SATURATION: 95 % | HEART RATE: 60 BPM

## 2019-05-29 PROCEDURE — 99214 OFFICE O/P EST MOD 30 MIN: CPT

## 2019-05-29 PROCEDURE — 36415 COLL VENOUS BLD VENIPUNCTURE: CPT

## 2019-05-29 NOTE — DISCUSSION/SUMMARY
[FreeTextEntry1] : Hypertrophic Cardiomyopathy, hypertension, Asthma, back pain--We discussed need for ICD followup and Pulmonary evaluation along with preop prior to elective surgery. Continue current medication. Labs drawn and sent

## 2019-05-29 NOTE — PHYSICAL EXAM
[General Appearance - Well Developed] : well developed [Well Groomed] : well groomed [Normal Appearance] : normal appearance [General Appearance - Well Nourished] : well nourished [No Deformities] : no deformities [General Appearance - In No Acute Distress] : no acute distress [] : no respiratory distress [Normal Conjunctiva] : the conjunctiva exhibited no abnormalities [Exaggerated Use Of Accessory Muscles For Inspiration] : no accessory muscle use [Respiration, Rhythm And Depth] : normal respiratory rhythm and effort [Auscultation Breath Sounds / Voice Sounds] : lungs were clear to auscultation bilaterally [Heart Sounds] : normal S1 and S2 [Abdomen Soft] : soft [Abnormal Walk] : normal gait [FreeTextEntry1] : no edema [Skin Turgor] : normal skin turgor [Oriented To Time, Place, And Person] : oriented to person, place, and time [Affect] : the affect was normal [Mood] : the mood was normal [No Anxiety] : not feeling anxious

## 2019-05-29 NOTE — HISTORY OF PRESENT ILLNESS
[FreeTextEntry1] : 78 year male who is considering back surgery in the future. He notes unexplained weight gain since starting Finasteride

## 2019-05-30 LAB
ALBUMIN SERPL ELPH-MCNC: 4.4 G/DL
ALP BLD-CCNC: 61 U/L
ALT SERPL-CCNC: 14 U/L
ANION GAP SERPL CALC-SCNC: 11 MMOL/L
AST SERPL-CCNC: 44 U/L
BASOPHILS # BLD AUTO: 0.05 K/UL
BASOPHILS NFR BLD AUTO: 0.7 %
BILIRUB SERPL-MCNC: 0.4 MG/DL
BUN SERPL-MCNC: 19 MG/DL
CALCIUM SERPL-MCNC: 9.9 MG/DL
CHLORIDE SERPL-SCNC: 103 MMOL/L
CHOLEST SERPL-MCNC: 246 MG/DL
CHOLEST/HDLC SERPL: 5.9 RATIO
CO2 SERPL-SCNC: 27 MMOL/L
CREAT SERPL-MCNC: 1.23 MG/DL
EOSINOPHIL # BLD AUTO: 0.23 K/UL
EOSINOPHIL NFR BLD AUTO: 3.3 %
FOLATE SERPL-MCNC: >20 NG/ML
GLUCOSE SERPL-MCNC: 101 MG/DL
GLUCOSE SERPL-MCNC: 96 MG/DL
HCT VFR BLD CALC: 38.1 %
HDLC SERPL-MCNC: 42 MG/DL
HGB BLD-MCNC: 12 G/DL
IMM GRANULOCYTES NFR BLD AUTO: 0.4 %
IRON SATN MFR SERPL: 53 %
IRON SERPL-MCNC: 131 UG/DL
LDLC SERPL CALC-MCNC: 170 MG/DL
LYMPHOCYTES # BLD AUTO: 1.02 K/UL
LYMPHOCYTES NFR BLD AUTO: 14.8 %
MAN DIFF?: NORMAL
MCHC RBC-ENTMCNC: 31.5 GM/DL
MCHC RBC-ENTMCNC: 32.2 PG
MCV RBC AUTO: 102.1 FL
MONOCYTES # BLD AUTO: 0.62 K/UL
MONOCYTES NFR BLD AUTO: 9 %
NEUTROPHILS # BLD AUTO: 4.95 K/UL
NEUTROPHILS NFR BLD AUTO: 71.8 %
NT-PROBNP SERPL-MCNC: 714 PG/ML
PLATELET # BLD AUTO: 228 K/UL
POTASSIUM SERPL-SCNC: 5.5 MMOL/L
PROT SERPL-MCNC: 6.8 G/DL
RBC # BLD: 3.73 M/UL
RBC # FLD: 13.1 %
SODIUM SERPL-SCNC: 141 MMOL/L
TIBC SERPL-MCNC: 249 UG/DL
TRIGL SERPL-MCNC: 170 MG/DL
UIBC SERPL-MCNC: 118 UG/DL
VIT B12 SERPL-MCNC: 865 PG/ML
WBC # FLD AUTO: 6.9 K/UL

## 2019-06-03 ENCOUNTER — MEDICATION RENEWAL (OUTPATIENT)
Age: 79
End: 2019-06-03

## 2019-06-05 ENCOUNTER — RX RENEWAL (OUTPATIENT)
Age: 79
End: 2019-06-05

## 2019-07-25 ENCOUNTER — RX RENEWAL (OUTPATIENT)
Age: 79
End: 2019-07-25

## 2019-07-25 RX ORDER — FLUTICASONE PROPIONATE 50 UG/1
50 SPRAY, METERED NASAL DAILY
Qty: 16 | Refills: 11 | Status: ACTIVE | COMMUNITY
Start: 2018-12-21 | End: 1900-01-01

## 2019-08-07 ENCOUNTER — RX RENEWAL (OUTPATIENT)
Age: 79
End: 2019-08-07

## 2019-08-07 RX ORDER — ALBUTEROL SULFATE 90 UG/1
108 (90 BASE) AEROSOL, METERED RESPIRATORY (INHALATION)
Qty: 1 | Refills: 1 | Status: ACTIVE | COMMUNITY
Start: 2017-06-29 | End: 1900-01-01

## 2019-08-30 ENCOUNTER — RX RENEWAL (OUTPATIENT)
Age: 79
End: 2019-08-30

## 2019-09-16 ENCOUNTER — APPOINTMENT (OUTPATIENT)
Dept: PULMONOLOGY | Facility: CLINIC | Age: 79
End: 2019-09-16
Payer: MEDICARE

## 2019-09-16 VITALS
OXYGEN SATURATION: 97 % | DIASTOLIC BLOOD PRESSURE: 80 MMHG | WEIGHT: 180 LBS | HEART RATE: 60 BPM | TEMPERATURE: 97.2 F | HEIGHT: 67.25 IN | BODY MASS INDEX: 27.92 KG/M2 | SYSTOLIC BLOOD PRESSURE: 120 MMHG

## 2019-09-16 DIAGNOSIS — R04.2 HEMOPTYSIS: ICD-10-CM

## 2019-09-16 DIAGNOSIS — J45.909 UNSPECIFIED ASTHMA, UNCOMPLICATED: ICD-10-CM

## 2019-09-16 PROCEDURE — 94010 BREATHING CAPACITY TEST: CPT

## 2019-09-16 PROCEDURE — 99214 OFFICE O/P EST MOD 30 MIN: CPT | Mod: 25

## 2019-09-16 PROCEDURE — 71046 X-RAY EXAM CHEST 2 VIEWS: CPT

## 2019-09-16 PROCEDURE — 90662 IIV NO PRSV INCREASED AG IM: CPT

## 2019-09-16 PROCEDURE — G0008: CPT

## 2019-09-16 NOTE — HISTORY OF PRESENT ILLNESS
[FreeTextEntry1] : 10/23/18: Asked to evaluate patient by Dr Garcia for congestion and dyspnea. He is a never smoker with a stated history of asthma who complains of chest congestion, cough and wheeze. Symptoms are triggered by GERD. He uses albuterol alone. He has a history of aspiration 6 yrs ago and sounds like he developed empyema and had a VATS. OCS as a child only. Never on maintenance inhalers. He can walk 3-5 blocks limited more by spine issues than breathing. Started Breo.\par \par 11/28/18: Doesn't feel much better on Breo. Still with a runny nose and a lot of PND. Quit using Flonase when he went on Breo. On direct questioning the cough and wheeze have improved. The wheeze at night has disappeared and he can sleep now.\par \par 9/16/19: Called Friday w small volume hemoptysis for about a week after having a cold about 2.5 weeks ago. Had green sputum which has cleared. No more blood since a little speck Friday evening. He thinks this is coming from his nasal spray because he sees blood when he blows his nose. His breathing feels "a little heavy" with a little wheeze. No chest pain. No weight loss but a little loss of appetite. Never smoker aside from social in teens.

## 2019-09-16 NOTE — ASSESSMENT
[FreeTextEntry1] : Data reviewed:\par \par Eos over time on chart 260-400/ul\par \par PA/lat CXR in office 10/23/2018 : PPM in place, blunted R CPA, no infiltrate\par PA/lat CXR in office 10/23/2018 : no new mass or infiltrate\par \par Rodrick 10/23/2018 : obstructed, FEV1 56% / FENO 30\par PFT 11/28/18: mild fixed obstruction, FEV1 70%, normal volumes, DLCO 60%\par Rodrick 9/16/19: obstructed, FEV1 64%\par \par Impression:\par Hemoptysis\par Asthma-COPD overlap\par \par Plan:\par The hemoptysis was small volume dark blood and appears to have resolved. Suspect either from epistaxis or from bronchitis. He is a nonsmoker. Defer CT at this time, but for any recurrence can obtain CT.\par No specific therapy necessary as he is already improving. He should call for any worsening.\par He should continue Breo and can hold nasal sprays till this fully resolves.\par Flu vaccine given.

## 2019-09-16 NOTE — PHYSICAL EXAM
[General Appearance - In No Acute Distress] : no acute distress [Normal Oropharynx] : normal oropharynx [FreeTextEntry1] : no blood in oral cavity or nares [Heart Rate And Rhythm] : heart rate and rhythm were normal [Heart Sounds] : normal S1 and S2 [Murmurs] : no murmurs present [Auscultation Breath Sounds / Voice Sounds] : lungs were clear to auscultation bilaterally

## 2019-09-20 ENCOUNTER — LABORATORY RESULT (OUTPATIENT)
Age: 79
End: 2019-09-20

## 2019-09-20 ENCOUNTER — APPOINTMENT (OUTPATIENT)
Dept: HEART AND VASCULAR | Facility: CLINIC | Age: 79
End: 2019-09-20
Payer: MEDICARE

## 2019-09-20 VITALS
HEIGHT: 67.25 IN | RESPIRATION RATE: 14 BRPM | DIASTOLIC BLOOD PRESSURE: 58 MMHG | SYSTOLIC BLOOD PRESSURE: 100 MMHG | WEIGHT: 180.02 LBS | TEMPERATURE: 97.8 F | OXYGEN SATURATION: 97 % | BODY MASS INDEX: 27.93 KG/M2 | HEART RATE: 59 BPM

## 2019-09-20 DIAGNOSIS — Z87.39 PERSONAL HISTORY OF OTHER DISEASES OF THE MUSCULOSKELETAL SYSTEM AND CONNECTIVE TISSUE: ICD-10-CM

## 2019-09-20 DIAGNOSIS — Z01.818 ENCOUNTER FOR OTHER PREPROCEDURAL EXAMINATION: ICD-10-CM

## 2019-09-20 DIAGNOSIS — R94.5 ABNORMAL RESULTS OF LIVER FUNCTION STUDIES: ICD-10-CM

## 2019-09-20 PROCEDURE — 99214 OFFICE O/P EST MOD 30 MIN: CPT

## 2019-09-20 PROCEDURE — 93000 ELECTROCARDIOGRAM COMPLETE: CPT

## 2019-09-20 PROCEDURE — 36415 COLL VENOUS BLD VENIPUNCTURE: CPT

## 2019-09-20 NOTE — REVIEW OF SYSTEMS
[Shortness Of Breath] : no shortness of breath [Dyspnea on exertion] : dyspnea during exertion [Chest Pain] : no chest pain [Chest  Pressure] : no chest pressure [Lower Ext Edema] : no extremity edema [Palpitations] : no palpitations [Leg Claudication] : no intermittent leg claudication [Urinary Frequency] : no change in urinary frequency [see HPI] : see HPI [Negative] : Psychiatric

## 2019-09-20 NOTE — PHYSICAL EXAM
[General Appearance - Well Developed] : well developed [Normal Appearance] : normal appearance [General Appearance - Well Nourished] : well nourished [Well Groomed] : well groomed [General Appearance - In No Acute Distress] : no acute distress [No Deformities] : no deformities [Normal Conjunctiva] : the conjunctiva exhibited no abnormalities [] : no respiratory distress [Respiration, Rhythm And Depth] : normal respiratory rhythm and effort [Exaggerated Use Of Accessory Muscles For Inspiration] : no accessory muscle use [Heart Sounds] : normal S1 and S2 [Auscultation Breath Sounds / Voice Sounds] : lungs were clear to auscultation bilaterally [FreeTextEntry1] : no edema [Abdomen Soft] : soft [Abnormal Walk] : normal gait [Skin Turgor] : normal skin turgor [Affect] : the affect was normal [Mood] : the mood was normal [Oriented To Time, Place, And Person] : oriented to person, place, and time [No Anxiety] : not feeling anxious

## 2019-09-20 NOTE — DISCUSSION/SUMMARY
[FreeTextEntry1] : I informed the patient that pending results of his preop labs drawn today, there is no medical or cardiac contraindication to the proposed eye surgery at this time

## 2019-09-20 NOTE — HISTORY OF PRESENT ILLNESS
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 He is scheduled for vitrectomy macular pucker on October 3, 2019 with Dr Nate Felix. He has his ICD checked over the phone every 3 months and is followed by Dr Almendarez. It was last checked in July. He had a recent evaluation with Pulmonary. His urination is stable on Finesteride. He is limited by back pain and walking less. No chest pain, no ICD shocks, no orthopnea or PND. No swelling of his legs

## 2019-09-23 LAB
ALBUMIN SERPL ELPH-MCNC: 4.6 G/DL
ALP BLD-CCNC: 59 U/L
ALT SERPL-CCNC: 12 U/L
ANION GAP SERPL CALC-SCNC: 12 MMOL/L
AST SERPL-CCNC: 42 U/L
BASOPHILS # BLD AUTO: 0.05 K/UL
BASOPHILS NFR BLD AUTO: 0.8 %
BILIRUB SERPL-MCNC: 0.3 MG/DL
BUN SERPL-MCNC: 19 MG/DL
CALCIUM SERPL-MCNC: 10.1 MG/DL
CHLORIDE SERPL-SCNC: 102 MMOL/L
CO2 SERPL-SCNC: 28 MMOL/L
CREAT SERPL-MCNC: 1.43 MG/DL
EOSINOPHIL # BLD AUTO: 0.06 K/UL
EOSINOPHIL NFR BLD AUTO: 0.9 %
GLUCOSE SERPL-MCNC: 98 MG/DL
HCT VFR BLD CALC: 39.8 %
HGB BLD-MCNC: 12.1 G/DL
LYMPHOCYTES # BLD AUTO: 1.37 K/UL
LYMPHOCYTES NFR BLD AUTO: 21.3 %
MAN DIFF?: NORMAL
MCHC RBC-ENTMCNC: 30.4 GM/DL
MCHC RBC-ENTMCNC: 32.2 PG
MCV RBC AUTO: 105.9 FL
MONOCYTES # BLD AUTO: 0.58 K/UL
MONOCYTES NFR BLD AUTO: 9 %
NEUTROPHILS # BLD AUTO: 4.33 K/UL
NEUTROPHILS NFR BLD AUTO: 67.2 %
PLATELET # BLD AUTO: 272 K/UL
POTASSIUM SERPL-SCNC: 5 MMOL/L
PROT SERPL-MCNC: 6.8 G/DL
RBC # BLD: 3.76 M/UL
RBC # FLD: 12.9 %
SODIUM SERPL-SCNC: 142 MMOL/L
WBC # FLD AUTO: 6.45 K/UL

## 2019-10-21 ENCOUNTER — LABORATORY RESULT (OUTPATIENT)
Age: 79
End: 2019-10-21

## 2019-10-21 ENCOUNTER — APPOINTMENT (OUTPATIENT)
Dept: HEART AND VASCULAR | Facility: CLINIC | Age: 79
End: 2019-10-21
Payer: MEDICARE

## 2019-10-21 DIAGNOSIS — N28.9 DISORDER OF KIDNEY AND URETER, UNSPECIFIED: ICD-10-CM

## 2019-10-21 PROCEDURE — 36415 COLL VENOUS BLD VENIPUNCTURE: CPT

## 2019-10-22 LAB
ALBUMIN SERPL ELPH-MCNC: 4.7 G/DL
ALP BLD-CCNC: 61 U/L
ALT SERPL-CCNC: 12 U/L
ANION GAP SERPL CALC-SCNC: 13 MMOL/L
APPEARANCE: CLEAR
AST SERPL-CCNC: 40 U/L
BILIRUB SERPL-MCNC: 0.3 MG/DL
BILIRUBIN URINE: NEGATIVE
BLOOD URINE: NEGATIVE
BUN SERPL-MCNC: 22 MG/DL
CALCIUM SERPL-MCNC: 10.1 MG/DL
CHLORIDE SERPL-SCNC: 104 MMOL/L
CO2 SERPL-SCNC: 26 MMOL/L
COLOR: YELLOW
CREAT SERPL-MCNC: 1.4 MG/DL
GLUCOSE QUALITATIVE U: NEGATIVE
GLUCOSE SERPL-MCNC: 86 MG/DL
KETONES URINE: NEGATIVE
LEUKOCYTE ESTERASE URINE: NEGATIVE
NITRITE URINE: NEGATIVE
PH URINE: 6
POTASSIUM SERPL-SCNC: 5 MMOL/L
PROT SERPL-MCNC: 7.2 G/DL
PROTEIN URINE: ABNORMAL
SODIUM SERPL-SCNC: 143 MMOL/L
SPECIFIC GRAVITY URINE: 1.02
UROBILINOGEN URINE: NORMAL

## 2019-10-24 ENCOUNTER — LABORATORY RESULT (OUTPATIENT)
Age: 79
End: 2019-10-24

## 2019-10-24 ENCOUNTER — APPOINTMENT (OUTPATIENT)
Dept: INTERNAL MEDICINE | Facility: CLINIC | Age: 79
End: 2019-10-24
Payer: MEDICARE

## 2019-10-24 VITALS
SYSTOLIC BLOOD PRESSURE: 90 MMHG | TEMPERATURE: 97.7 F | BODY MASS INDEX: 27.67 KG/M2 | HEART RATE: 60 BPM | WEIGHT: 178 LBS | OXYGEN SATURATION: 94 % | DIASTOLIC BLOOD PRESSURE: 60 MMHG

## 2019-10-24 PROCEDURE — 36415 COLL VENOUS BLD VENIPUNCTURE: CPT

## 2019-10-24 PROCEDURE — 99214 OFFICE O/P EST MOD 30 MIN: CPT

## 2019-10-24 NOTE — HISTORY OF PRESENT ILLNESS
[de-identified] : Here for follow up for his CKD and HTN\chase Has been on prilosec daily for the past 2 years.\chase Takes enalapril daily and HCTZ on a prn basis. Reports his SBP can be up to 160 at times.    Denies sig leg swelling.  \chase Still drinking plenty of iced tea\chase Eats breakfast out daily.

## 2019-10-24 NOTE — PLAN
[FreeTextEntry1] : CKD 3 and HTN\par Low BP but asymptomatic. COntinue to hold HCTZ, continue ACEi\par - renal function appears stable\par -send for renal US\par -\par Possible association of PPI with CKD . if Cr continues to rise will rec stopping it.

## 2019-10-24 NOTE — PHYSICAL EXAM
[Normal] : affect was normal and insight and judgment were intact [de-identified] : ICD in palce.   [de-identified] : using cane with ambulation.   [de-identified] : trace edema b/l le

## 2019-10-25 LAB
25(OH)D3 SERPL-MCNC: 58.2 NG/ML
ALBUMIN SERPL ELPH-MCNC: 4.8 G/DL
ALP BLD-CCNC: 58 U/L
ALT SERPL-CCNC: 11 U/L
ANION GAP SERPL CALC-SCNC: 17 MMOL/L
APPEARANCE: CLEAR
AST SERPL-CCNC: 42 U/L
BACTERIA: NEGATIVE
BILIRUB SERPL-MCNC: 0.3 MG/DL
BILIRUBIN URINE: NEGATIVE
BLOOD URINE: NEGATIVE
BUN SERPL-MCNC: 20 MG/DL
CALCIUM SERPL-MCNC: 9.9 MG/DL
CALCIUM SERPL-MCNC: 9.9 MG/DL
CHLORIDE SERPL-SCNC: 102 MMOL/L
CO2 SERPL-SCNC: 24 MMOL/L
COLOR: YELLOW
CREAT SERPL-MCNC: 1.54 MG/DL
CREAT SPEC-SCNC: 258 MG/DL
CREAT/PROT UR: 0.4 RATIO
ESTIMATED AVERAGE GLUCOSE: 111 MG/DL
GLUCOSE QUALITATIVE U: NEGATIVE
GLUCOSE SERPL-MCNC: 105 MG/DL
HBA1C MFR BLD HPLC: 5.5 %
HYALINE CASTS: 8 /LPF
KETONES URINE: NEGATIVE
LEUKOCYTE ESTERASE URINE: NEGATIVE
MAGNESIUM SERPL-MCNC: 2.3 MG/DL
MICROSCOPIC-UA: NORMAL
NITRITE URINE: NEGATIVE
PARATHYROID HORMONE INTACT: 38 PG/ML
PH URINE: 5.5
PHOSPHATE SERPL-MCNC: 3.7 MG/DL
POTASSIUM SERPL-SCNC: 4.9 MMOL/L
PROT SERPL-MCNC: 7 G/DL
PROT UR-MCNC: 105 MG/DL
PROTEIN URINE: ABNORMAL
RED BLOOD CELLS URINE: 3 /HPF
SODIUM SERPL-SCNC: 143 MMOL/L
SPECIFIC GRAVITY URINE: 1.02
SQUAMOUS EPITHELIAL CELLS: 2 /HPF
URATE SERPL-MCNC: 8 MG/DL
UROBILINOGEN URINE: NORMAL
WHITE BLOOD CELLS URINE: 3 /HPF

## 2019-10-27 ENCOUNTER — FORM ENCOUNTER (OUTPATIENT)
Age: 79
End: 2019-10-27

## 2019-10-28 ENCOUNTER — APPOINTMENT (OUTPATIENT)
Dept: ULTRASOUND IMAGING | Facility: HOSPITAL | Age: 79
End: 2019-10-28
Payer: MEDICARE

## 2019-10-28 ENCOUNTER — OUTPATIENT (OUTPATIENT)
Dept: OUTPATIENT SERVICES | Facility: HOSPITAL | Age: 79
LOS: 1 days | End: 2019-10-28
Payer: MEDICARE

## 2019-10-28 PROCEDURE — 76770 US EXAM ABDO BACK WALL COMP: CPT | Mod: 26

## 2019-10-28 PROCEDURE — 76770 US EXAM ABDO BACK WALL COMP: CPT

## 2019-12-04 ENCOUNTER — RX RENEWAL (OUTPATIENT)
Age: 79
End: 2019-12-04

## 2020-01-06 ENCOUNTER — APPOINTMENT (OUTPATIENT)
Dept: HEART AND VASCULAR | Facility: CLINIC | Age: 80
End: 2020-01-06

## 2020-01-07 ENCOUNTER — RX RENEWAL (OUTPATIENT)
Age: 80
End: 2020-01-07

## 2020-01-14 ENCOUNTER — APPOINTMENT (OUTPATIENT)
Dept: INTERNAL MEDICINE | Facility: CLINIC | Age: 80
End: 2020-01-14
Payer: MEDICARE

## 2020-01-14 VITALS
DIASTOLIC BLOOD PRESSURE: 62 MMHG | TEMPERATURE: 98.4 F | BODY MASS INDEX: 25.2 KG/M2 | WEIGHT: 180 LBS | SYSTOLIC BLOOD PRESSURE: 100 MMHG | HEART RATE: 60 BPM | OXYGEN SATURATION: 95 % | HEIGHT: 71 IN

## 2020-01-14 DIAGNOSIS — I10 ESSENTIAL (PRIMARY) HYPERTENSION: ICD-10-CM

## 2020-01-14 DIAGNOSIS — Z00.00 ENCOUNTER FOR GENERAL ADULT MEDICAL EXAMINATION W/OUT ABNORMAL FINDINGS: ICD-10-CM

## 2020-01-14 DIAGNOSIS — D64.9 ANEMIA, UNSPECIFIED: ICD-10-CM

## 2020-01-14 DIAGNOSIS — N40.0 BENIGN PROSTATIC HYPERPLASIA WITHOUT LOWER URINARY TRACT SYMPMS: ICD-10-CM

## 2020-01-14 DIAGNOSIS — N18.3 CHRONIC KIDNEY DISEASE, STAGE 3 (MODERATE): ICD-10-CM

## 2020-01-14 PROCEDURE — 36415 COLL VENOUS BLD VENIPUNCTURE: CPT

## 2020-01-14 PROCEDURE — 99214 OFFICE O/P EST MOD 30 MIN: CPT

## 2020-01-14 RX ORDER — OMEPRAZOLE 40 MG/1
40 CAPSULE, DELAYED RELEASE ORAL
Qty: 30 | Refills: 0 | Status: DISCONTINUED | COMMUNITY
Start: 2018-01-02 | End: 2020-01-14

## 2020-01-14 NOTE — HISTORY OF PRESENT ILLNESS
[de-identified] : 80 yo M with CKD 3, HTN, BPH. HCM, asthma.\par s/p eye procedure- left eye is blurry - does 5 different eye drops a day.\par Yesterday with stomach issues - had sausage in the AM. no diarrhea, but a lot of reflux.  This am feeling better. \par Had seen [facility] - Dr Singh in the past.  \par - Now off PPI and on H2 blocker instead\par BP remains low-  no dizziness.  taking acei daily.  But reports his BP at home 140/75\par He is not on a statin - He had refused in the past, years ago but states he had muscle pain with it\par

## 2020-01-14 NOTE — PHYSICAL EXAM
[Normal] : affect was normal and insight and judgment were intact [de-identified] : ICD in palce.   [de-identified] : trace edema b/l le [de-identified] : using cane with ambulation.

## 2020-01-14 NOTE — PLAN
[FreeTextEntry1] : CKD 3 and HTN\par Low BP but asymptomatic. COntinue to hold HCTZ, continue ACEi\par - renal function appears stable\par -CYstic disease reviewed in detail\par  - COntinue ACEi\par -\par -BMP today\par encourage f/up with Dr Hurt

## 2020-01-14 NOTE — REVIEW OF SYSTEMS
[Vision Problems] : vision problems [Frequency] : frequency [Negative] : Psychiatric [Dizziness] : no dizziness [FreeTextEntry7] : as above

## 2020-01-15 LAB
ANION GAP SERPL CALC-SCNC: 16 MMOL/L
BUN SERPL-MCNC: 25 MG/DL
CALCIUM SERPL-MCNC: 10 MG/DL
CHLORIDE SERPL-SCNC: 102 MMOL/L
CO2 SERPL-SCNC: 24 MMOL/L
CREAT SERPL-MCNC: 1.54 MG/DL
GLUCOSE SERPL-MCNC: 93 MG/DL
POTASSIUM SERPL-SCNC: 4.8 MMOL/L
SODIUM SERPL-SCNC: 142 MMOL/L

## 2020-02-07 ENCOUNTER — RX RENEWAL (OUTPATIENT)
Age: 80
End: 2020-02-07

## 2020-03-04 ENCOUNTER — RX RENEWAL (OUTPATIENT)
Age: 80
End: 2020-03-04

## 2020-03-20 RX ORDER — FLUTICASONE PROPIONATE AND SALMETEROL 250; 50 UG/1; UG/1
250-50 POWDER RESPIRATORY (INHALATION) TWICE DAILY
Qty: 1 | Refills: 11 | Status: ACTIVE | COMMUNITY
Start: 2020-03-20 | End: 1900-01-01

## 2020-03-23 RX ORDER — FLUTICASONE FUROATE AND VILANTEROL TRIFENATATE 100; 25 UG/1; UG/1
100-25 POWDER RESPIRATORY (INHALATION)
Qty: 1 | Refills: 11 | Status: ACTIVE | COMMUNITY
Start: 2018-10-23 | End: 1900-01-01

## 2020-03-26 ENCOUNTER — APPOINTMENT (OUTPATIENT)
Dept: HEART AND VASCULAR | Facility: CLINIC | Age: 80
End: 2020-03-26

## 2020-04-13 ENCOUNTER — RX RENEWAL (OUTPATIENT)
Age: 80
End: 2020-04-13

## 2020-04-27 ENCOUNTER — RX RENEWAL (OUTPATIENT)
Age: 80
End: 2020-04-27

## 2020-05-26 ENCOUNTER — RX RENEWAL (OUTPATIENT)
Age: 80
End: 2020-05-26

## 2020-06-15 ENCOUNTER — RX RENEWAL (OUTPATIENT)
Age: 80
End: 2020-06-15

## 2020-07-13 ENCOUNTER — RX RENEWAL (OUTPATIENT)
Age: 80
End: 2020-07-13

## 2020-07-28 ENCOUNTER — RX RENEWAL (OUTPATIENT)
Age: 80
End: 2020-07-28

## 2020-07-30 RX ORDER — AZELASTINE HYDROCHLORIDE 137 UG/1
0.1 SPRAY, METERED NASAL DAILY
Qty: 1 | Refills: 11 | Status: ACTIVE | COMMUNITY
Start: 2018-12-21 | End: 1900-01-01

## 2020-08-11 ENCOUNTER — RX RENEWAL (OUTPATIENT)
Age: 80
End: 2020-08-11

## 2020-08-20 ENCOUNTER — APPOINTMENT (OUTPATIENT)
Dept: HEART AND VASCULAR | Facility: CLINIC | Age: 80
End: 2020-08-20
Payer: MEDICARE

## 2020-08-20 VITALS
TEMPERATURE: 98.5 F | WEIGHT: 175 LBS | SYSTOLIC BLOOD PRESSURE: 140 MMHG | HEART RATE: 60 BPM | DIASTOLIC BLOOD PRESSURE: 70 MMHG | BODY MASS INDEX: 24.5 KG/M2 | HEIGHT: 71 IN

## 2020-08-20 PROCEDURE — 99213 OFFICE O/P EST LOW 20 MIN: CPT | Mod: 25

## 2020-08-20 PROCEDURE — 93283 PRGRMG EVAL IMPLANTABLE DFB: CPT

## 2020-08-26 NOTE — PROCEDURE
[No] : not [NSR] : normal sinus rhythm [ICD] : Implantable cardioverter-defibrillator [DDD] : DDD [Threshold Testing Performed] : Threshold testing was performed [Lead Imp:  ___ohms] : lead impedance was [unfilled] ohms [Sensing Amplitude ___mv] : sensing amplitude was [unfilled] mv [___V @] : [unfilled] V [___ ms] : [unfilled] ms [de-identified] : [facility]  [de-identified] : [id number] [de-identified] : Teligen [de-identified] : 4/22/11 [de-identified] : 60/120 [de-identified] : 2 years [de-identified] : VT time to therapy increase [de-identified] : 4 beasts NSVT\par 1 episode of noise lasting seconds\par shock impedance 56\par AP 88%\par  25%

## 2020-08-26 NOTE — REVIEW OF SYSTEMS
[see HPI] : see HPI [Negative] : Neurological [Fever] : no fever [Chills] : no chills [Feeling Fatigued] : not feeling fatigued

## 2020-08-26 NOTE — DISCUSSION/SUMMARY
[AICD Function Normal] : normal AICD function [FreeTextEntry1] : Mr. Feliz is a pleasant 80 year old male with hypertension, hypercholesterolemia, chronic diastolic heart failure and septal hypertrophy with syncope s/p ICD, who presents for device check.  His device is functioning normally and all measured data is within normal limits.   He has a history of hypertrophic cardiomyopathy with no recent syncope and no significant NSVT seen on his device.  We spoke about a genetic component associated with HCM and I have suggested his family members get echocardiograms.  I have also suggested that he remains well hydrated throughout the day.  His leads are 20 years old and there is a very short episode of noise on RV lead - the device did not see this.  We discussed that he needs close follow up to monitor this and any longer episodes would warrant a lead revision.  He will follow up in 4-6 months or sooner if needed and knows to call with any questions or concerns in the interm.  \par

## 2020-08-26 NOTE — PHYSICAL EXAM
[General Appearance - Well Developed] : well developed [Well Groomed] : well groomed [Normal Appearance] : normal appearance [No Deformities] : no deformities [General Appearance - In No Acute Distress] : no acute distress [General Appearance - Well Nourished] : well nourished [Heart Rate And Rhythm] : heart rate and rhythm were normal [Heart Sounds] : normal S1 and S2 [] : no respiratory distress [Edema] : no peripheral edema present [Respiration, Rhythm And Depth] : normal respiratory rhythm and effort [Exaggerated Use Of Accessory Muscles For Inspiration] : no accessory muscle use [Left Infraclavicular] : left infraclavicular area [Clean] : clean [Dry] : dry [Well-Healed] : well-healed [Cyanosis, Localized] : no localized cyanosis [Abdomen Soft] : soft [Palpable Crepitus] : no palpable crepitus [Foul Odor] : no foul smell [Bleeding] : no active bleeding [Purulent Drainage] : no purulent drainage [Serosanguineous Drainage] : no serosanquineous drainage [Serous Drainage] : no serous drainage [Warm] : not warm [Erythema] : not erythematous [Indurated] : not indurated [Tender] : not tender [Fluctuant] : not fluctuant

## 2020-08-26 NOTE — ADDENDUM
[FreeTextEntry1] : I, Clemente Almendarez, hereby attest that the medical record entry for this patient accurately reflects signatures/notations that I made on the Date of Service in my capacity as an Attending Physician when I treated/diagnosed the above patient. I do hereby attest that this information is true, accurate and complete to the best of my knowledge and I understand that any falsification, omission, or concealment of material fact may subject me to administrative, civil, or, criminal liability. \par I was present for the entire visit and supervised the entire visit and agree with the plan as outlined.\par

## 2020-08-26 NOTE — HISTORY OF PRESENT ILLNESS
[Palpitations] : no palpitations [SOB] : no dyspnea [Syncope] : no syncope [Chest Pain] : no chest pain or discomfort [Dizziness] : no dizziness [Shoulder Pain] : no shoulder pain [Pain at Site] : no pain at device site [Swelling at Site] : no swelling at device site [Erythema at Site] : no erythema at device site [de-identified] : Mr. Feliz is a 80 year old male with hypertension, hypercholesterolemia, chronic diastolic heart failure and septal hypertrophy with syncope s/p ICD, who presents for device check.\par \par Mr. Feliz states that his ICD was initially put in secondary to multiple syncopal episodes and hypertrophic cardiomyopathy.  HE has not had any syncope over the past few years.  He notes some MCDOWELL when walking up stairs only.  Denies chest pain, near syncope, palpitations, orthopnea. He notes no device related complaints.  He has been called about noise on RV lead via remote monitoring and this correlated with him having a procedure.  \par \par  \par

## 2020-09-15 ENCOUNTER — RX RENEWAL (OUTPATIENT)
Age: 80
End: 2020-09-15

## 2020-12-29 ENCOUNTER — APPOINTMENT (OUTPATIENT)
Dept: OPHTHALMOLOGY | Facility: CLINIC | Age: 80
End: 2020-12-29

## 2021-01-04 ENCOUNTER — APPOINTMENT (OUTPATIENT)
Dept: OPHTHALMOLOGY | Facility: CLINIC | Age: 81
End: 2021-01-04
Payer: MEDICARE

## 2021-01-04 ENCOUNTER — NON-APPOINTMENT (OUTPATIENT)
Age: 81
End: 2021-01-04

## 2021-01-04 PROCEDURE — 92002 INTRM OPH EXAM NEW PATIENT: CPT

## 2021-01-11 ENCOUNTER — RX RENEWAL (OUTPATIENT)
Age: 81
End: 2021-01-11

## 2021-01-26 ENCOUNTER — TRANSCRIPTION ENCOUNTER (OUTPATIENT)
Age: 81
End: 2021-01-26

## 2021-01-27 ENCOUNTER — OUTPATIENT (OUTPATIENT)
Dept: OUTPATIENT SERVICES | Facility: HOSPITAL | Age: 81
LOS: 1 days | Discharge: HOME | End: 2021-01-27
Payer: MEDICARE

## 2021-01-27 ENCOUNTER — RESULT REVIEW (OUTPATIENT)
Age: 81
End: 2021-01-27

## 2021-01-27 ENCOUNTER — APPOINTMENT (OUTPATIENT)
Dept: OPHTHALMOLOGY | Facility: AMBULATORY SURGERY CENTER | Age: 81
End: 2021-01-27

## 2021-01-27 LAB
GRAM STN FLD: SIGNIFICANT CHANGE UP
SPECIMEN SOURCE: SIGNIFICANT CHANGE UP

## 2021-01-27 PROCEDURE — 65756 CORNEAL TRNSPL ENDOTHELIAL: CPT | Mod: LT

## 2021-01-27 PROCEDURE — 88304 TISSUE EXAM BY PATHOLOGIST: CPT | Mod: 26

## 2021-01-28 ENCOUNTER — NON-APPOINTMENT (OUTPATIENT)
Age: 81
End: 2021-01-28

## 2021-01-28 ENCOUNTER — APPOINTMENT (OUTPATIENT)
Dept: OPHTHALMOLOGY | Facility: CLINIC | Age: 81
End: 2021-01-28
Payer: MEDICARE

## 2021-01-28 PROCEDURE — 99024 POSTOP FOLLOW-UP VISIT: CPT

## 2021-01-31 LAB
CULTURE RESULTS: NO GROWTH — SIGNIFICANT CHANGE UP
SPECIMEN SOURCE: SIGNIFICANT CHANGE UP

## 2021-02-02 LAB — SURGICAL PATHOLOGY STUDY: SIGNIFICANT CHANGE UP

## 2021-02-03 ENCOUNTER — APPOINTMENT (OUTPATIENT)
Dept: OPHTHALMOLOGY | Facility: CLINIC | Age: 81
End: 2021-02-03
Payer: MEDICARE

## 2021-02-03 ENCOUNTER — NON-APPOINTMENT (OUTPATIENT)
Age: 81
End: 2021-02-03

## 2021-02-03 PROCEDURE — 99024 POSTOP FOLLOW-UP VISIT: CPT

## 2021-02-22 ENCOUNTER — NON-APPOINTMENT (OUTPATIENT)
Age: 81
End: 2021-02-22

## 2021-02-22 ENCOUNTER — APPOINTMENT (OUTPATIENT)
Dept: OPHTHALMOLOGY | Facility: CLINIC | Age: 81
End: 2021-02-22
Payer: MEDICARE

## 2021-02-22 PROCEDURE — 99024 POSTOP FOLLOW-UP VISIT: CPT

## 2021-03-04 ENCOUNTER — APPOINTMENT (OUTPATIENT)
Dept: HEART AND VASCULAR | Facility: CLINIC | Age: 81
End: 2021-03-04
Payer: MEDICARE

## 2021-03-04 VITALS
HEIGHT: 71 IN | DIASTOLIC BLOOD PRESSURE: 65 MMHG | WEIGHT: 175 LBS | SYSTOLIC BLOOD PRESSURE: 129 MMHG | BODY MASS INDEX: 24.5 KG/M2 | HEART RATE: 60 BPM | TEMPERATURE: 91.4 F

## 2021-03-04 PROCEDURE — 93283 PRGRMG EVAL IMPLANTABLE DFB: CPT

## 2021-03-04 PROCEDURE — 99212 OFFICE O/P EST SF 10 MIN: CPT | Mod: 25

## 2021-03-04 NOTE — OBJECTIVE
What Is The Reason For Today's Visit?: Excessive sun exposure [History reviewed] : History reviewed. [Medications and Allergies reviewed] : Medications and allergies reviewed.

## 2021-03-08 NOTE — PHYSICAL EXAM
[General Appearance - Well Developed] : well developed [Normal Appearance] : normal appearance [Well Groomed] : well groomed [General Appearance - Well Nourished] : well nourished [No Deformities] : no deformities [General Appearance - In No Acute Distress] : no acute distress [Heart Rate And Rhythm] : heart rate and rhythm were normal [Heart Sounds] : normal S1 and S2 [Edema] : no peripheral edema present [] : no respiratory distress [Respiration, Rhythm And Depth] : normal respiratory rhythm and effort [Exaggerated Use Of Accessory Muscles For Inspiration] : no accessory muscle use [Left Infraclavicular] : left infraclavicular area [Clean] : clean [Dry] : dry [Well-Healed] : well-healed [Cyanosis, Localized] : no localized cyanosis [Palpable Crepitus] : no palpable crepitus [Bleeding] : no active bleeding [Foul Odor] : no foul smell [Purulent Drainage] : no purulent drainage [Serosanguineous Drainage] : no serosanquineous drainage [Serous Drainage] : no serous drainage [Erythema] : not erythematous [Warm] : not warm [Tender] : not tender [Indurated] : not indurated [Fluctuant] : not fluctuant

## 2021-03-08 NOTE — PROCEDURE
[No] : not [NSR] : normal sinus rhythm [ICD] : Implantable cardioverter-defibrillator [DDD] : DDD [Threshold Testing Performed] : Threshold testing was performed [Lead Imp:  ___ohms] : lead impedance was [unfilled] ohms [Sensing Amplitude ___mv] : sensing amplitude was [unfilled] mv [___V @] : [unfilled] V [___ ms] : [unfilled] ms [de-identified] : [facility]  [de-identified] : Teligen [de-identified] : [id number] [de-identified] : 4/22/11 [de-identified] : 60/120 [de-identified] : 2 years [de-identified] : VT time to therapy increase [de-identified] : 2 short episodes of NSVT <10 beats\par  \par shock impedance 56\par AP 88%\par  26%

## 2021-03-08 NOTE — DISCUSSION/SUMMARY
[AICD Function Normal] : normal AICD function [FreeTextEntry1] : Mr. Feliz is a pleasant 80 year old male with hypertension, hypercholesterolemia, chronic diastolic heart failure and septal hypertrophy with syncope s/p ICD, who presents for device check.  His device is functioning normally and all measured data is within normal limits.   He has a history of hypertrophic cardiomyopathy with no recent syncope and no significant NSVT seen on his device.  We spoke about a genetic component associated with HCM and I have suggested his family members get echocardiograms.  I have also suggested that he remains well hydrated throughout the day.  No further noise on his leads but we again discussed that they are 20 years old and that we need to monitor this and at generator change can consider a lead revision.  We discussed that he needs close follow up to monitor this and any longer episodes would warrant a lead revision.  He will follow up in 4-6 months or sooner if needed and knows to call with any questions or concerns in the interm.  \par

## 2021-03-08 NOTE — HISTORY OF PRESENT ILLNESS
[Palpitations] : no palpitations [SOB] : no dyspnea [Syncope] : no syncope [Dizziness] : no dizziness [Chest Pain] : no chest pain or discomfort [Shoulder Pain] : no shoulder pain [Pain at Site] : no pain at device site [Erythema at Site] : no erythema at device site [Swelling at Site] : no swelling at device site [de-identified] : Mr. Feliz is a 80 year old male with hypertension, hypercholesterolemia, chronic diastolic heart failure and septal hypertrophy with syncope s/p ICD, who presents for device check.\par \par Mr. Feliz states that his ICD was initially put in secondary to multiple syncopal episodes and hypertrophic cardiomyopathy.  HE has not had any syncope over the past few years.  He notes some MCDOWELL when walking up stairs only.  Denies chest pain, near syncope, palpitations, orthopnea. He notes no device related complaints.  On his last visit he was found to have noise on the RV lead.  He presents for routine follow up.  No chest pain, SOB, syncope, near syncope, orthopnea, PND or palpitations.  No device related issues.    \par \par  \par

## 2021-03-10 ENCOUNTER — NON-APPOINTMENT (OUTPATIENT)
Age: 81
End: 2021-03-10

## 2021-03-10 ENCOUNTER — APPOINTMENT (OUTPATIENT)
Dept: OPHTHALMOLOGY | Facility: CLINIC | Age: 81
End: 2021-03-10
Payer: MEDICARE

## 2021-03-10 PROCEDURE — 99024 POSTOP FOLLOW-UP VISIT: CPT

## 2021-03-12 ENCOUNTER — RX RENEWAL (OUTPATIENT)
Age: 81
End: 2021-03-12

## 2021-03-26 ENCOUNTER — APPOINTMENT (OUTPATIENT)
Dept: OPHTHALMOLOGY | Facility: CLINIC | Age: 81
End: 2021-03-26

## 2021-04-13 ENCOUNTER — RX RENEWAL (OUTPATIENT)
Age: 81
End: 2021-04-13

## 2021-04-21 ENCOUNTER — NON-APPOINTMENT (OUTPATIENT)
Age: 81
End: 2021-04-21

## 2021-04-21 ENCOUNTER — APPOINTMENT (OUTPATIENT)
Dept: OPHTHALMOLOGY | Facility: CLINIC | Age: 81
End: 2021-04-21
Payer: MEDICARE

## 2021-04-21 PROCEDURE — 92012 INTRM OPH EXAM EST PATIENT: CPT | Mod: 24

## 2021-05-11 ENCOUNTER — RX RENEWAL (OUTPATIENT)
Age: 81
End: 2021-05-11

## 2021-05-11 RX ORDER — VERAPAMIL HYDROCHLORIDE 240 MG/1
240 TABLET ORAL
Qty: 15 | Refills: 0 | Status: ACTIVE | COMMUNITY
Start: 2018-12-24 | End: 1900-01-01

## 2021-07-06 ENCOUNTER — NON-APPOINTMENT (OUTPATIENT)
Age: 81
End: 2021-07-06

## 2021-07-06 ENCOUNTER — APPOINTMENT (OUTPATIENT)
Dept: HEART AND VASCULAR | Facility: CLINIC | Age: 81
End: 2021-07-06
Payer: MEDICARE

## 2021-07-06 PROCEDURE — 93296 REM INTERROG EVL PM/IDS: CPT

## 2021-07-06 PROCEDURE — 93294 REM INTERROG EVL PM/LDLS PM: CPT

## 2021-07-12 ENCOUNTER — APPOINTMENT (OUTPATIENT)
Dept: OPHTHALMOLOGY | Facility: CLINIC | Age: 81
End: 2021-07-12
Payer: MEDICARE

## 2021-07-12 ENCOUNTER — NON-APPOINTMENT (OUTPATIENT)
Age: 81
End: 2021-07-12

## 2021-07-12 PROCEDURE — 92136 OPHTHALMIC BIOMETRY: CPT

## 2021-07-12 PROCEDURE — 92014 COMPRE OPH EXAM EST PT 1/>: CPT

## 2021-08-15 ENCOUNTER — TRANSCRIPTION ENCOUNTER (OUTPATIENT)
Age: 81
End: 2021-08-15

## 2021-08-16 ENCOUNTER — NON-APPOINTMENT (OUTPATIENT)
Age: 81
End: 2021-08-16

## 2021-08-16 ENCOUNTER — APPOINTMENT (OUTPATIENT)
Dept: OPHTHALMOLOGY | Facility: AMBULATORY SURGERY CENTER | Age: 81
End: 2021-08-16

## 2021-08-16 ENCOUNTER — OUTPATIENT (OUTPATIENT)
Dept: OUTPATIENT SERVICES | Facility: HOSPITAL | Age: 81
LOS: 1 days | Discharge: HOME | End: 2021-08-16
Payer: MEDICARE

## 2021-08-16 PROCEDURE — 66984 XCAPSL CTRC RMVL W/O ECP: CPT | Mod: RT

## 2021-08-17 ENCOUNTER — APPOINTMENT (OUTPATIENT)
Dept: OPHTHALMOLOGY | Facility: CLINIC | Age: 81
End: 2021-08-17
Payer: MEDICARE

## 2021-08-17 ENCOUNTER — NON-APPOINTMENT (OUTPATIENT)
Age: 81
End: 2021-08-17

## 2021-08-17 PROCEDURE — 99024 POSTOP FOLLOW-UP VISIT: CPT

## 2021-08-23 ENCOUNTER — NON-APPOINTMENT (OUTPATIENT)
Age: 81
End: 2021-08-23

## 2021-08-23 ENCOUNTER — APPOINTMENT (OUTPATIENT)
Dept: OPHTHALMOLOGY | Facility: CLINIC | Age: 81
End: 2021-08-23
Payer: MEDICARE

## 2021-08-23 PROCEDURE — 99024 POSTOP FOLLOW-UP VISIT: CPT

## 2021-09-22 ENCOUNTER — APPOINTMENT (OUTPATIENT)
Dept: OPHTHALMOLOGY | Facility: CLINIC | Age: 81
End: 2021-09-22
Payer: MEDICARE

## 2021-09-22 ENCOUNTER — NON-APPOINTMENT (OUTPATIENT)
Age: 81
End: 2021-09-22

## 2021-09-22 PROCEDURE — 99024 POSTOP FOLLOW-UP VISIT: CPT

## 2021-09-30 ENCOUNTER — APPOINTMENT (OUTPATIENT)
Dept: HEART AND VASCULAR | Facility: CLINIC | Age: 81
End: 2021-09-30
Payer: MEDICARE

## 2021-09-30 VITALS
WEIGHT: 170 LBS | DIASTOLIC BLOOD PRESSURE: 84 MMHG | BODY MASS INDEX: 23.8 KG/M2 | TEMPERATURE: 97.1 F | HEIGHT: 71 IN | HEART RATE: 60 BPM | SYSTOLIC BLOOD PRESSURE: 179 MMHG

## 2021-09-30 PROCEDURE — 93283 PRGRMG EVAL IMPLANTABLE DFB: CPT

## 2021-09-30 PROCEDURE — 99212 OFFICE O/P EST SF 10 MIN: CPT | Mod: 25

## 2021-10-04 NOTE — PROCEDURE
[No] : not [NSR] : normal sinus rhythm [ICD] : Implantable cardioverter-defibrillator [DDD] : DDD [Threshold Testing Performed] : Threshold testing was performed [Lead Imp:  ___ohms] : lead impedance was [unfilled] ohms [Sensing Amplitude ___mv] : sensing amplitude was [unfilled] mv [___V @] : [unfilled] V [___ ms] : [unfilled] ms [None] : none [de-identified] : [facility]  [de-identified] : Teligen [de-identified] : [id number] [de-identified] : 4/22/11 [de-identified] : 60/120 [de-identified] : 1 year  [de-identified] : 2 short episodes of NSVT <5 beats\par  \par shock impedance 58\par AP 85%\par  26%

## 2021-10-04 NOTE — DISCUSSION/SUMMARY
[AICD Function Normal] : normal AICD function [FreeTextEntry1] : Mr. Feliz is a pleasant 81 year old male with hypertension, hypercholesterolemia, chronic diastolic heart failure and septal hypertrophy with syncope s/p ICD, who presents for device check.  His device is functioning normally and all measured data is within normal limits.   He has a history of hypertrophic cardiomyopathy with no recent syncope and no significant ectopy seen on his device.  We spoke about a genetic component associated with HCM and I have suggested his family members get echocardiograms.  I have also suggested that he remains well hydrated throughout the day.  No further noise on his leads but we again discussed that they are 20 years old and that we need to monitor this and at generator change can consider a lead revision.  He is aware that his generator will need to be changed in about a year.   We discussed that he needs close follow up to monitor this and any longer episodes would warrant a lead revision.  He will follow up in 4-6 months or sooner if needed and knows to call with any questions or concerns in the interm.  \par

## 2021-10-04 NOTE — HISTORY OF PRESENT ILLNESS
[Palpitations] : no palpitations [SOB] : no dyspnea [Syncope] : no syncope [Dizziness] : no dizziness [Chest Pain] : no chest pain or discomfort [Shoulder Pain] : no shoulder pain [Pain at Site] : no pain at device site [Erythema at Site] : no erythema at device site [Swelling at Site] : no swelling at device site [de-identified] : Mr. Feliz is a 81 year old male with hypertension, hypercholesterolemia, chronic diastolic heart failure and septal hypertrophy with syncope s/p ICD, who presents for device check.\par \par Mr. Feliz states that his ICD was initially put in secondary to multiple syncopal episodes and hypertrophic cardiomyopathy.  HE has not had any syncope over the past few years.  He notes some MCDOWELL when walking up stairs only.  Denies chest pain, near syncope, palpitations, orthopnea. He notes no device related complaints.  Previously he was found to have noise on the RV lead.  He presents for routine follow up.  Since his last visit he had eye surgery.   No chest pain, SOB, syncope, near syncope, orthopnea, PND or palpitations.  No device related issues.    \par \par  \par

## 2021-10-04 NOTE — PHYSICAL EXAM
[General Appearance - Well Developed] : well developed [Normal Appearance] : normal appearance [Well Groomed] : well groomed [General Appearance - Well Nourished] : well nourished [No Deformities] : no deformities [General Appearance - In No Acute Distress] : no acute distress [Heart Rate And Rhythm] : heart rate and rhythm were normal [Heart Sounds] : normal S1 and S2 [Edema] : no peripheral edema present [] : no respiratory distress [Respiration, Rhythm And Depth] : normal respiratory rhythm and effort [Exaggerated Use Of Accessory Muscles For Inspiration] : no accessory muscle use [Left Infraclavicular] : left infraclavicular area [Clean] : clean [Dry] : dry [Well-Healed] : well-healed [Palpable Crepitus] : no palpable crepitus [Bleeding] : no active bleeding [Foul Odor] : no foul smell [Purulent Drainage] : no purulent drainage [Serosanguineous Drainage] : no serosanquineous drainage [Serous Drainage] : no serous drainage [Erythema] : not erythematous [Warm] : not warm [Tender] : not tender [Indurated] : not indurated [Fluctuant] : not fluctuant

## 2021-10-04 NOTE — REVIEW OF SYSTEMS
[Negative] : Heme/Lymph [Fever] : no fever [Chills] : no chills [Feeling Fatigued] : not feeling fatigued [SOB] : no shortness of breath [Dyspnea on exertion] : not dyspnea during exertion [Palpitations] : no palpitations [Syncope] : no syncope

## 2021-10-05 ENCOUNTER — NON-APPOINTMENT (OUTPATIENT)
Age: 81
End: 2021-10-05

## 2021-10-05 ENCOUNTER — APPOINTMENT (OUTPATIENT)
Dept: HEART AND VASCULAR | Facility: CLINIC | Age: 81
End: 2021-10-05
Payer: MEDICARE

## 2021-10-05 PROCEDURE — 93296 REM INTERROG EVL PM/IDS: CPT

## 2021-10-05 PROCEDURE — 93294 REM INTERROG EVL PM/LDLS PM: CPT

## 2021-10-25 ENCOUNTER — RX RENEWAL (OUTPATIENT)
Age: 81
End: 2021-10-25

## 2021-11-24 ENCOUNTER — RX RENEWAL (OUTPATIENT)
Age: 81
End: 2021-11-24

## 2022-01-10 ENCOUNTER — NON-APPOINTMENT (OUTPATIENT)
Age: 82
End: 2022-01-10

## 2022-01-10 ENCOUNTER — APPOINTMENT (OUTPATIENT)
Dept: OPHTHALMOLOGY | Facility: CLINIC | Age: 82
End: 2022-01-10

## 2022-01-10 ENCOUNTER — APPOINTMENT (OUTPATIENT)
Dept: HEART AND VASCULAR | Facility: CLINIC | Age: 82
End: 2022-01-10
Payer: MEDICARE

## 2022-01-10 PROCEDURE — 93296 REM INTERROG EVL PM/IDS: CPT

## 2022-01-10 PROCEDURE — 93295 DEV INTERROG REMOTE 1/2/MLT: CPT

## 2022-03-31 ENCOUNTER — APPOINTMENT (OUTPATIENT)
Dept: HEART AND VASCULAR | Facility: CLINIC | Age: 82
End: 2022-03-31
Payer: MEDICARE

## 2022-03-31 VITALS
WEIGHT: 170 LBS | TEMPERATURE: 79.8 F | DIASTOLIC BLOOD PRESSURE: 79 MMHG | HEART RATE: 60 BPM | SYSTOLIC BLOOD PRESSURE: 153 MMHG | HEIGHT: 71 IN | BODY MASS INDEX: 23.8 KG/M2

## 2022-03-31 PROCEDURE — 93283 PRGRMG EVAL IMPLANTABLE DFB: CPT

## 2022-03-31 PROCEDURE — 99213 OFFICE O/P EST LOW 20 MIN: CPT | Mod: 25

## 2022-04-07 ENCOUNTER — NON-APPOINTMENT (OUTPATIENT)
Age: 82
End: 2022-04-07

## 2022-04-11 ENCOUNTER — APPOINTMENT (OUTPATIENT)
Dept: HEART AND VASCULAR | Facility: CLINIC | Age: 82
End: 2022-04-11
Payer: MEDICARE

## 2022-04-11 ENCOUNTER — NON-APPOINTMENT (OUTPATIENT)
Age: 82
End: 2022-04-11

## 2022-04-11 PROCEDURE — 93296 REM INTERROG EVL PM/IDS: CPT

## 2022-04-11 PROCEDURE — 93295 DEV INTERROG REMOTE 1/2/MLT: CPT

## 2022-04-12 NOTE — PROCEDURE
[No] : not [NSR] : normal sinus rhythm [ICD] : Implantable cardioverter-defibrillator [DDD] : DDD [Threshold Testing Performed] : Threshold testing was performed [___ ms] : [unfilled] ms [None] : none [Lead Imp:  ___ohms] : lead impedance was [unfilled] ohms [Sensing Amplitude ___mv] : sensing amplitude was [unfilled] mv [___V @] : [unfilled] V [de-identified] : [facility]  [de-identified] : Teligen [de-identified] : [id number] [de-identified] : 4/22/11 [de-identified] : 60/120 [de-identified] : 1 year  [de-identified] : NSVT, no VT/VF\par  \par shock impedance 53\par AP 83%\par  25%

## 2022-04-12 NOTE — DISCUSSION/SUMMARY
[AICD Function Normal] : normal AICD function [FreeTextEntry1] : Mr. Feliz is a pleasant 81 year old male with hypertension, hypercholesterolemia, chronic diastolic heart failure and septal hypertrophy with syncope s/p ICD, who presents for device check.  His device is functioning normally and all measured data is within normal limits.   He has a history of hypertrophic cardiomyopathy with no recent syncope and no significant ectopy seen on his device.  We spoke about a genetic component associated with HCM and I have suggested his family members get echocardiograms.  I have also suggested that he remains well hydrated throughout the day.  No further noise on his leads but we again discussed that they are 20 years old and that we need to monitor this and at generator change can consider a lead revision.  He is aware that his generator will need to be changed in about a year.   We discussed that he needs close follow up to monitor this and any longer episodes would warrant a lead revision. We have sent off genetic testing today and will contact him when the results are returned. He and his son are aware that this will take months. He will follow up in 4-6 months or sooner if needed and knows to call with any questions or concerns in the interm.  \par

## 2022-04-12 NOTE — HISTORY OF PRESENT ILLNESS
[Palpitations] : no palpitations [SOB] : no dyspnea [Syncope] : no syncope [Dizziness] : no dizziness [Chest Pain] : no chest pain or discomfort [Shoulder Pain] : no shoulder pain [Pain at Site] : no pain at device site [Erythema at Site] : no erythema at device site [Swelling at Site] : no swelling at device site [de-identified] : Mr. Feliz is a 81 year old male with hypertension, hypercholesterolemia, chronic diastolic heart failure and septal hypertrophy with syncope s/p ICD, who presents for device check.\par \par Mr. Feliz states that his ICD was initially put in secondary to multiple syncopal episodes and hypertrophic cardiomyopathy.  He has not had any syncope over the past few years.  He notes some MCDOWELL when walking up stairs only.  Denies chest pain, near syncope, palpitations, orthopnea. He notes no device related complaints.  Previously he was found to have noise on the RV lead.  He presents for routine follow up.  Since his last visit he had eye surgery.   No chest pain, SOB, syncope, near syncope, orthopnea, PND or palpitations.  No device related issues.    \par \par He presents today with his son. We will plan to send off genetic studies for HCM. The patient has children and grandchildren. He also has a nephew with an ICD (reason unknown).  \par

## 2022-07-28 ENCOUNTER — APPOINTMENT (OUTPATIENT)
Dept: HEART AND VASCULAR | Facility: CLINIC | Age: 82
End: 2022-07-28

## 2022-07-28 VITALS
BODY MASS INDEX: 23.1 KG/M2 | DIASTOLIC BLOOD PRESSURE: 68 MMHG | WEIGHT: 165 LBS | TEMPERATURE: 97.2 F | HEIGHT: 71 IN | SYSTOLIC BLOOD PRESSURE: 155 MMHG | HEART RATE: 60 BPM

## 2022-07-28 PROCEDURE — 93283 PRGRMG EVAL IMPLANTABLE DFB: CPT

## 2022-07-28 PROCEDURE — 99213 OFFICE O/P EST LOW 20 MIN: CPT | Mod: 25

## 2022-08-01 ENCOUNTER — NON-APPOINTMENT (OUTPATIENT)
Age: 82
End: 2022-08-01

## 2022-08-01 ENCOUNTER — APPOINTMENT (OUTPATIENT)
Dept: HEART AND VASCULAR | Facility: CLINIC | Age: 82
End: 2022-08-01

## 2022-08-01 PROCEDURE — 93295 DEV INTERROG REMOTE 1/2/MLT: CPT

## 2022-08-01 PROCEDURE — 93296 REM INTERROG EVL PM/IDS: CPT

## 2022-08-04 NOTE — HISTORY OF PRESENT ILLNESS
[None] : The patient complains of no symptoms [de-identified] : Mr. Feliz is an 81 year old man with a history of HFpEF (60-65%), syncope s/p ICD, HCM, HTN, HLD who presents for a device follow-up. He reports no device related complaints. He is scheduled for surgery on his right hip in the following week and would like device clearance for the procedure. His genetic testing from his last visit did not return positive for any genes associated with HCM. 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

## 2022-08-04 NOTE — PROCEDURE
Reviewed allergies and possible side effects prior to injection. The patient received TDAP. No signs or symptoms of allergic reaction we observed. Patient tolerated procedure well. Educated patient to call office with any questions or concerns.          [No] : not [Sinus Bradycardia] : sinus bradycardia [ICD] : Implantable cardioverter-defibrillator [DDD] : DDD [Longevity: ___ months] : The estimated remaining battery life is [unfilled] months [Lead Imp:  ___ohms] : lead impedance was [unfilled] ohms [Sensing Amplitude ___mv] : sensing amplitude was [unfilled] mv [___V @] : [unfilled] V [___ ms] : [unfilled] ms [de-identified] : [facility] [de-identified] : Teligen 100 [de-identified] : [id number] [de-identified] : 04/22/2011 [de-identified] :  [de-identified] : RV Shock impedance: 53 ohms [de-identified] : A paced 83%\par V paced 26%

## 2022-08-04 NOTE — ADDENDUM
[FreeTextEntry1] : I, Clemente Almendarez, hereby attest that the medical record entry for this patient accurately reflects signatures/notations that I made on the Date of Service in my capacity as an Attending Physician when I treated/diagnosed the above patient. I do hereby attest that this information is true, accurate and complete to the best of my knowledge and I understand that any falsification, omission, or concealment of material fact may subject me to administrative, civil, or, criminal liability. I agree with the note as written by my Fellow in its entirety.\par I was present for the entire visit and supervised the entire visit and agree with the plan as outlined.\par \par

## 2022-08-04 NOTE — PHYSICAL EXAM
[General Appearance - Well Developed] : well developed [Normal Appearance] : normal appearance [General Appearance - Well Nourished] : well nourished [Heart Rate And Rhythm] : heart rate and rhythm were normal [Heart Sounds] : normal S1 and S2 [Edema] : no peripheral edema present [] : no respiratory distress [Respiration, Rhythm And Depth] : normal respiratory rhythm and effort [Exaggerated Use Of Accessory Muscles For Inspiration] : no accessory muscle use [Left Infraclavicular] : left infraclavicular area [Clean] : clean [Dry] : dry [Well-Healed] : well-healed [Palpable Crepitus] : no palpable crepitus [Bleeding] : no active bleeding [Foul Odor] : no foul smell [Purulent Drainage] : no purulent drainage [Serosanguineous Drainage] : no serosanquineous drainage [Serous Drainage] : no serous drainage [Erythema] : not erythematous [Warm] : not warm [Tender] : not tender [Indurated] : not indurated [Fluctuant] : not fluctuant

## 2022-08-04 NOTE — DISCUSSION/SUMMARY
[AICD Function Normal] : normal AICD function [FreeTextEntry1] : Mr. Feliz is a pleasant 81 year old male with HFpEF (60-65%), syncope s/p ICD, HCM, HTN, HLD, who presents for device check. His device is functioning normally and all measured data is within normal limits. He has a history of hypertrophic cardiomyopathy with no recent syncope and no significant ectopy seen on his device. His device has 10 months of estimated battery life left. We spoke about the need for a generator change next year in the spring/early summer. No noise on his leads, but his atrial lead has shown a small gradual decline in impedance (250s -> 220s). The lead is, however, capturing appropriately. We discussed that the leads will be interrogated during the generator change and a decision will be made at that time about any possible intervention on the lead(s). He is requesting a note to provide to his orthopedics providers for device management in the perioperative period, which we will give to him. He will follow up in 6 months or sooner if needed and knows to call with any questions or concerns in the interm. \par

## 2022-11-01 ENCOUNTER — NON-APPOINTMENT (OUTPATIENT)
Age: 82
End: 2022-11-01

## 2022-11-01 ENCOUNTER — APPOINTMENT (OUTPATIENT)
Dept: HEART AND VASCULAR | Facility: CLINIC | Age: 82
End: 2022-11-01

## 2022-11-01 PROCEDURE — 93295 DEV INTERROG REMOTE 1/2/MLT: CPT

## 2022-11-01 PROCEDURE — 93296 REM INTERROG EVL PM/IDS: CPT

## 2022-11-02 ENCOUNTER — NON-APPOINTMENT (OUTPATIENT)
Age: 82
End: 2022-11-02

## 2022-12-01 ENCOUNTER — APPOINTMENT (OUTPATIENT)
Dept: HEART AND VASCULAR | Facility: CLINIC | Age: 82
End: 2022-12-01

## 2022-12-01 VITALS
DIASTOLIC BLOOD PRESSURE: 80 MMHG | BODY MASS INDEX: 23.1 KG/M2 | TEMPERATURE: 93.5 F | HEART RATE: 60 BPM | OXYGEN SATURATION: 98 % | HEIGHT: 71 IN | SYSTOLIC BLOOD PRESSURE: 148 MMHG | WEIGHT: 165 LBS

## 2022-12-01 PROCEDURE — 93283 PRGRMG EVAL IMPLANTABLE DFB: CPT

## 2022-12-01 PROCEDURE — 99212 OFFICE O/P EST SF 10 MIN: CPT

## 2022-12-12 NOTE — HISTORY OF PRESENT ILLNESS
[None] : The patient complains of no symptoms [de-identified] : Mr. Feliz is an 82 year old man with a history of HFpEF (60-65%), syncope s/p ICD, HCM, HTN, HLD who presents for a device follow-up. He reports no device related complaints. His genetic testing did not return positive for any genes associated with HCM. 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  He states that after his hip surgery his blood pressure was low and he was taken off medications and now it is higher. \par \par

## 2022-12-12 NOTE — ADDENDUM
[FreeTextEntry1] : I, Clemente Almendarez, hereby attest that the medical record entry for this patient accurately reflects signatures/notations that I made on the Date of Service in my capacity as an Attending Physician when I treated/diagnosed the above patient. I do hereby attest that this information is true, accurate and complete to the best of my knowledge and I understand that any falsification, omission, or concealment of material fact may subject me to administrative, civil, or, criminal liability. I agree with the note as written by my PA in its entirety.\par I was present for the entire visit and supervised the entire visit and agree with the plan as outlined.\par \par \par I, Solo Larry, am scribing for and the presence of Dr. Almendarez the following sections: HPI, PMH,Family/social history, ROS, Physical Exam, Assessment / Plan.\par \par \par

## 2022-12-12 NOTE — PHYSICAL EXAM
[General Appearance - Well Developed] : well developed [Normal Appearance] : normal appearance [General Appearance - Well Nourished] : well nourished [Heart Rate And Rhythm] : heart rate and rhythm were normal [Heart Sounds] : normal S1 and S2 [Edema] : no peripheral edema present [] : no respiratory distress [Respiration, Rhythm And Depth] : normal respiratory rhythm and effort [Exaggerated Use Of Accessory Muscles For Inspiration] : no accessory muscle use [Left Infraclavicular] : left infraclavicular area [Clean] : clean [Dry] : dry [Well-Healed] : well-healed [Auscultation Breath Sounds / Voice Sounds] : lungs were clear to auscultation bilaterally [Palpable Crepitus] : no palpable crepitus [Bleeding] : no active bleeding [Foul Odor] : no foul smell [Purulent Drainage] : no purulent drainage [Serous Drainage] : no serous drainage [Erythema] : not erythematous [Warm] : not warm [Tender] : not tender [Indurated] : not indurated [Fluctuant] : not fluctuant

## 2022-12-12 NOTE — REVIEW OF SYSTEMS
[Negative] : Heme/Lymph [Fever] : no fever [Weight Gain (___ Lbs)] : no recent weight gain [Chills] : no chills [Weight Loss (___ Lbs)] : no recent weight loss [Chest Discomfort] : no chest discomfort

## 2022-12-12 NOTE — PROCEDURE
[No] : not [Sinus Bradycardia] : sinus bradycardia [ICD] : Implantable cardioverter-defibrillator [DDD] : DDD [Longevity: ___ months] : The estimated remaining battery life is [unfilled] months [Lead Imp:  ___ohms] : lead impedance was [unfilled] ohms [Sensing Amplitude ___mv] : sensing amplitude was [unfilled] mv [___V @] : [unfilled] V [___ ms] : [unfilled] ms [de-identified] : [facility] [de-identified] : Teligen 100 [de-identified] : [id number] [de-identified] : 04/22/2011 [de-identified] :  [de-identified] : RV Shock impedance: 53 ohms [de-identified] : A paced 81%\par V paced 24%

## 2022-12-12 NOTE — DISCUSSION/SUMMARY
[AICD Function Normal] : normal AICD function [FreeTextEntry1] : Mr. Feliz is a pleasant 81 year old male with HFpEF (60-65%), syncope s/p ICD, HCM, HTN, HLD, who presents for device check. His device is functioning normally and all measured data is within normal limits. He has a history of hypertrophic cardiomyopathy with no recent syncope and no significant ectopy seen on his device. His device has 4 months of estimated battery life left. We discussed when it hits RRT he has at least 2 months on the battery. He will follow up in 2 months at which time we will  schedule him for a generator change and decide if we need to place a new RA lead (impedance stable in 220s, captures well, no further noise). He is upset about his volatile blood pressure and most readings are on the higher side.  He will go back on enalapril.  I have recommended he sees a cardiologist for routine cardiology care as we discussed we focus on his pacemaker here.   He will follow up in 2 months and knows to call with any questions or concerns in the interm. \par

## 2023-02-23 ENCOUNTER — NON-APPOINTMENT (OUTPATIENT)
Age: 83
End: 2023-02-23

## 2023-02-23 ENCOUNTER — APPOINTMENT (OUTPATIENT)
Dept: HEART AND VASCULAR | Facility: CLINIC | Age: 83
End: 2023-02-23
Payer: MEDICARE

## 2023-02-23 VITALS
TEMPERATURE: 95 F | HEART RATE: 60 BPM | SYSTOLIC BLOOD PRESSURE: 142 MMHG | HEIGHT: 71 IN | WEIGHT: 165 LBS | BODY MASS INDEX: 23.1 KG/M2 | DIASTOLIC BLOOD PRESSURE: 65 MMHG

## 2023-02-23 PROCEDURE — 99214 OFFICE O/P EST MOD 30 MIN: CPT | Mod: 25

## 2023-02-23 PROCEDURE — 93280 PM DEVICE PROGR EVAL DUAL: CPT

## 2023-03-08 ENCOUNTER — FORM ENCOUNTER (OUTPATIENT)
Age: 83
End: 2023-03-08

## 2023-03-24 ENCOUNTER — INPATIENT (INPATIENT)
Facility: HOSPITAL | Age: 83
LOS: 0 days | Discharge: HOME | DRG: 245 | End: 2023-03-25
Attending: INTERNAL MEDICINE | Admitting: INTERNAL MEDICINE
Payer: COMMERCIAL

## 2023-03-24 VITALS — WEIGHT: 164.91 LBS | HEIGHT: 71 IN

## 2023-03-24 DIAGNOSIS — Z90.49 ACQUIRED ABSENCE OF OTHER SPECIFIED PARTS OF DIGESTIVE TRACT: Chronic | ICD-10-CM

## 2023-03-24 DIAGNOSIS — Z98.890 OTHER SPECIFIED POSTPROCEDURAL STATES: Chronic | ICD-10-CM

## 2023-03-24 DIAGNOSIS — Z95.810 PRESENCE OF AUTOMATIC (IMPLANTABLE) CARDIAC DEFIBRILLATOR: Chronic | ICD-10-CM

## 2023-03-24 PROCEDURE — 33241 REMOVE PULSE GENERATOR: CPT

## 2023-03-24 PROCEDURE — 33249 INSJ/RPLCMT DEFIB W/LEAD(S): CPT

## 2023-03-24 PROCEDURE — 99222 1ST HOSP IP/OBS MODERATE 55: CPT | Mod: 57

## 2023-03-24 PROCEDURE — 93010 ELECTROCARDIOGRAM REPORT: CPT

## 2023-03-24 RX ORDER — POTASSIUM CITRATE MONOHYDRATE 100 %
1 POWDER (GRAM) MISCELLANEOUS
Qty: 0 | Refills: 0 | DISCHARGE

## 2023-03-24 RX ORDER — FLUTICASONE FUROATE AND VILANTEROL TRIFENATATE 100; 25 UG/1; UG/1
1 POWDER RESPIRATORY (INHALATION)
Qty: 0 | Refills: 0 | DISCHARGE

## 2023-03-24 RX ORDER — ASPIRIN/CALCIUM CARB/MAGNESIUM 324 MG
1 TABLET ORAL
Qty: 0 | Refills: 0 | DISCHARGE

## 2023-03-24 RX ORDER — METOPROLOL TARTRATE 50 MG
1 TABLET ORAL
Qty: 0 | Refills: 0 | DISCHARGE

## 2023-03-24 RX ORDER — GABAPENTIN 400 MG/1
300 CAPSULE ORAL THREE TIMES A DAY
Refills: 0 | Status: DISCONTINUED | OUTPATIENT
Start: 2023-03-24 | End: 2023-03-25

## 2023-03-24 RX ORDER — GABAPENTIN 400 MG/1
1 CAPSULE ORAL
Qty: 0 | Refills: 0 | DISCHARGE

## 2023-03-24 RX ORDER — FINASTERIDE 5 MG/1
5 TABLET, FILM COATED ORAL DAILY
Refills: 0 | Status: DISCONTINUED | OUTPATIENT
Start: 2023-03-24 | End: 2023-03-25

## 2023-03-24 RX ORDER — ASPIRIN/CALCIUM CARB/MAGNESIUM 324 MG
81 TABLET ORAL DAILY
Refills: 0 | Status: DISCONTINUED | OUTPATIENT
Start: 2023-03-24 | End: 2023-03-25

## 2023-03-24 RX ORDER — FLUTICASONE PROPIONATE AND SALMETEROL 50; 250 UG/1; UG/1
1 POWDER ORAL; RESPIRATORY (INHALATION)
Qty: 0 | Refills: 0 | DISCHARGE

## 2023-03-24 RX ORDER — VERAPAMIL HCL 240 MG
0 CAPSULE, EXTENDED RELEASE PELLETS 24 HR ORAL
Qty: 0 | Refills: 0 | DISCHARGE

## 2023-03-24 RX ORDER — VERAPAMIL HCL 240 MG
240 CAPSULE, EXTENDED RELEASE PELLETS 24 HR ORAL DAILY
Refills: 0 | Status: DISCONTINUED | OUTPATIENT
Start: 2023-03-24 | End: 2023-03-25

## 2023-03-24 RX ORDER — FINASTERIDE 5 MG/1
1 TABLET, FILM COATED ORAL
Qty: 0 | Refills: 0 | DISCHARGE

## 2023-03-24 RX ORDER — METOPROLOL TARTRATE 50 MG
100 TABLET ORAL
Refills: 0 | Status: DISCONTINUED | OUTPATIENT
Start: 2023-03-24 | End: 2023-03-25

## 2023-03-24 NOTE — H&P ADULT - NSICDXPASTSURGICALHX_GEN_ALL_CORE_FT
PAST SURGICAL HISTORY:  History of automatic internal cardiac defibrillator (AICD)     History of hip surgery     S/P appendectomy     S/P cholecystectomy

## 2023-03-24 NOTE — H&P ADULT - HISTORY OF PRESENT ILLNESS
82 year old man with a history of HFpEF (60-65%), syncope s/p ICD, HCM, HTN, HLD who presents for scheduled ICD generator change and possible atrial lead revision.      He reports no device related complaints. His genetic testing did not return positive for any genes associated with HCM. 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  He states he does not see a cardiologist anymore.   No palpitations, chest pain, SOB, syncope, orthopnea, edema  No changes in medical history or medications since last office follow up.

## 2023-03-24 NOTE — H&P ADULT - ASSESSMENT
82 year old man with a history of HFpEF (60-65%), syncope s/p ICD, HCM, HTN, HLD who presents for scheduled ICD generator change and possible atrial lead revision.

## 2023-03-24 NOTE — PRE-ANESTHESIA EVALUATION ADULT - NSANTHOSAYNRD_GEN_A_CORE
No. THIERNO screening performed.  STOP BANG Legend: 0-2 = LOW Risk; 3-4 = INTERMEDIATE Risk; 5-8 = HIGH Risk

## 2023-03-24 NOTE — PATIENT PROFILE ADULT - FALL HARM RISK - HARM RISK INTERVENTIONS
Assistance with ambulation/Assistance OOB with selected safe patient handling equipment/Communicate Risk of Fall with Harm to all staff/Discuss with provider need for PT consult/Monitor gait and stability/Provide patient with walking aids - walker, cane, crutches/Reinforce activity limits and safety measures with patient and family/Sit up slowly, dangle for a short time, stand at bedside before walking/Tailored Fall Risk Interventions/Use of alarms - bed, chair and/or voice tab/Visual Cue: Yellow wristband and red socks/Bed in lowest position, wheels locked, appropriate side rails in place/Call bell, personal items and telephone in reach/Instruct patient to call for assistance before getting out of bed or chair/Non-slip footwear when patient is out of bed/[place] to call system/Physically safe environment - no spills, clutter or unnecessary equipment/Purposeful Proactive Rounding/Room/bathroom lighting operational, light cord in reach

## 2023-03-24 NOTE — H&P ADULT - NSICDXPASTMEDICALHX_GEN_ALL_CORE_FT
PAST MEDICAL HISTORY:  H/O hypertrophic cardiomyopathy     HLD (hyperlipidemia)     HTN (hypertension)     Syncope

## 2023-03-25 ENCOUNTER — TRANSCRIPTION ENCOUNTER (OUTPATIENT)
Age: 83
End: 2023-03-25

## 2023-03-25 VITALS — TEMPERATURE: 97 F

## 2023-03-25 PROCEDURE — 93005 ELECTROCARDIOGRAM TRACING: CPT

## 2023-03-25 PROCEDURE — C1889: CPT

## 2023-03-25 PROCEDURE — 85014 HEMATOCRIT: CPT

## 2023-03-25 PROCEDURE — 33263 RMVL & RPLCMT DFB GEN 2 LEAD: CPT

## 2023-03-25 PROCEDURE — 71046 X-RAY EXAM CHEST 2 VIEWS: CPT

## 2023-03-25 PROCEDURE — C1769: CPT

## 2023-03-25 PROCEDURE — 84132 ASSAY OF SERUM POTASSIUM: CPT

## 2023-03-25 PROCEDURE — 82803 BLOOD GASES ANY COMBINATION: CPT

## 2023-03-25 PROCEDURE — C1892: CPT

## 2023-03-25 PROCEDURE — C1721: CPT

## 2023-03-25 PROCEDURE — 84295 ASSAY OF SERUM SODIUM: CPT

## 2023-03-25 PROCEDURE — 82330 ASSAY OF CALCIUM: CPT

## 2023-03-25 PROCEDURE — 82947 ASSAY GLUCOSE BLOOD QUANT: CPT

## 2023-03-25 PROCEDURE — 71046 X-RAY EXAM CHEST 2 VIEWS: CPT | Mod: 26

## 2023-03-25 PROCEDURE — 82565 ASSAY OF CREATININE: CPT

## 2023-03-25 PROCEDURE — C1898: CPT

## 2023-03-25 PROCEDURE — 85610 PROTHROMBIN TIME: CPT

## 2023-03-25 RX ADMIN — GABAPENTIN 300 MILLIGRAM(S): 400 CAPSULE ORAL at 13:41

## 2023-03-25 RX ADMIN — FINASTERIDE 5 MILLIGRAM(S): 5 TABLET, FILM COATED ORAL at 13:40

## 2023-03-25 RX ADMIN — Medication 81 MILLIGRAM(S): at 13:40

## 2023-03-25 NOTE — DISCHARGE NOTE PROVIDER - CARE PROVIDER_API CALL
Clemente Almendarez (MD)  Cardiac Electrophysiology; Cardiovascular Disease; Internal Medicine  [address] [place] [address]  Phone: [phone]  Fax: [phone]  Established Patient  Follow Up Time: 2 weeks

## 2023-03-25 NOTE — DISCHARGE NOTE PROVIDER - NSDCMRMEDTOKEN_GEN_ALL_CORE_FT
aspirin 81 mg oral tablet, chewable: 1 tab(s) orally once a day  Breo Ellipta 100 mcg-25 mcg/inh inhalation powder: 1 puff(s) inhaled once a day  enalapril 20 mg oral tablet: 1 tab(s) orally once a day  finasteride 5 mg oral tablet: 1 tab(s) orally once a day  fluticasone-salmeterol 250 mcg-50 mcg inhalation powder: 1 puff(s) inhaled 2 times a day  gabapentin 300 mg oral capsule: 1 cap(s) orally 3 times a day  Metoprolol Succinate  mg oral tablet, extended release: 1 tab(s) orally 2 times a day  potassium citrate 10 mEq oral tablet, extended release: 1 tab(s) orally once a day  verapamil 240 mg/24 hours oral tablet, extended release: orally once a day

## 2023-03-25 NOTE — DISCHARGE NOTE NURSING/CASE MANAGEMENT/SOCIAL WORK - NSDCPEFALRISK_GEN_ALL_CORE
For information on Fall & Injury Prevention, visit: [email or web address] [place]  [email or web address] [place]  https://www.cdc.gov/steadi/patient.html

## 2023-03-25 NOTE — DISCHARGE NOTE PROVIDER - HOSPITAL COURSE
82 year old man with a history of HFpEF (60-65%), syncope s/p ICD, HCM, HTN, HLD who presents for scheduled ICD generator change and possible atrial lead revision.    He reports no device related complaints. His genetic testing did not return positive for any genes associated with HCM. 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    now s/p successful A lead revision. CXR 3/25/23 AM showing no pneumothorax    Pt is asymptomatic at this time and denies chest pain, SOB, MCDOWELL, palpitations, dizziness, LOC, N/V, diaphoresis, orthopnea/PND, and leg swelling. Pt able to ambulate and void without complication. VSS. Labs and telemetry reviewed. Pt is a candidate for discharge per Dr. Shannon. Pt given appropriate discharge instructions, pt states they have an appropriate amount of their previous home meds unchanged from this visit at home, and any new medications were sent to their pharmacy. Pt instructed to f/u with Dr. Almendarez in 1-2 weeks.

## 2023-03-25 NOTE — PROGRESS NOTE ADULT - SUBJECTIVE AND OBJECTIVE BOX
Brief note, full note to follow.  [employer] device at End of Service. Atrial lead with decreasing impedances, and intermittent noise.  Venogram showed narrowing of left subclavian/axillary system, but patency to the right heart.  A new [employer] active fixation lead was advanced and fixated to the high right atrium, away from the old atrial lead.  A new [employer] ICD was implanted after testing of the RV lead showed normal function,  Patient tolerated the procedure without complication.  Bedrest for 3 hours, overnight observation.  Chest xray and ECG in am  Programmed DDD  ppm  Would restart eliquis tuesday or wednesday of next week.
HPI:   82 year old man with a history of HFpEF (60-65%), syncope s/p ICD, HCM, HTN, HLD who presents for scheduled ICD generator change and possible atrial lead revision.     Patient seen and examined. He is doing well. Has some soreness around the incision site but otherwise feels well and is ready to go home. His site is clean dry and intact    ROS: A 10-point review of systems was otherwise negative.    PAST MEDICAL & SURGICAL HISTORY:  Syncope      H/O hypertrophic cardiomyopathy      HLD (hyperlipidemia)      HTN (hypertension)      History of automatic internal cardiac defibrillator (AICD)      S/P cholecystectomy      S/P appendectomy      History of hip surgery          Home Medications:  aspirin 81 mg oral tablet, chewable: 1 tab(s) orally once a day (24 Mar 2023 13:30)  Breo Ellipta 100 mcg-25 mcg/inh inhalation powder: 1 puff(s) inhaled once a day (24 Mar 2023 13:30)  enalapril 20 mg oral tablet: 1 tab(s) orally once a day (24 Mar 2023 13:30)  finasteride 5 mg oral tablet: 1 tab(s) orally once a day (24 Mar 2023 13:30)  fluticasone-salmeterol 250 mcg-50 mcg inhalation powder: 1 puff(s) inhaled 2 times a day (24 Mar 2023 13:30)  gabapentin 300 mg oral capsule: 1 cap(s) orally 3 times a day (24 Mar 2023 13:30)  Metoprolol Succinate  mg oral tablet, extended release: 1 tab(s) orally 2 times a day (24 Mar 2023 13:30)  potassium citrate 10 mEq oral tablet, extended release: 1 tab(s) orally once a day (24 Mar 2023 13:30)  verapamil 240 mg/24 hours oral tablet, extended release: orally once a day (24 Mar 2023 13:30)      SOCIAL HISTORY:  FAMILY HISTORY:      ALLERGIES: 	  Valium (Drowsiness)            MEDICATIONS:  aspirin  chewable 81 milliGRAM(s) Oral daily  finasteride 5 milliGRAM(s) Oral daily  gabapentin 300 milliGRAM(s) Oral three times a day  metoprolol succinate  milliGRAM(s) Oral two times a day  verapamil  milliGRAM(s) Oral daily      PHYSICAL EXAM:  T(C): 36 (03-25-23 @ 14:24), Max: 36.2 (03-25-23 @ 08:28)  HR: 60 (03-25-23 @ 13:45) (59 - 60)  BP: 104/55 (03-25-23 @ 13:45) (104/55 - 123/60)  RR: 18 (03-25-23 @ 13:45) (18 - 18)  SpO2: 95% (03-25-23 @ 13:45) (95% - 98%)  Wt(kg): --    03-25-23 @ 07:01  -  03-26-23 @ 01:07  --------------------------------------------------------  IN: 180 mL / OUT: 0 mL / NET: 180 mL        GEN: Awake, comfortable. NAD.   HEENT: NCAT, PERRL, EOMI. Mucosa moist.   NECK: Supple, no JVD.   RESP: CTA b/l  CV: RRR, normal s1/s2. No m/r/g, incision site c/d/i, no hematoma  ABD: Soft, NTND. BS+  EXT: Warm. No edema, clubbing, or cyanosis.   NEURO: AAOx3. No focal deficits.    I&O's Summary    25 Mar 2023 07:01  -  26 Mar 2023 01:07  --------------------------------------------------------  IN: 180 mL / OUT: 0 mL / NET: 180 mL        	  LABS:	 	    CARDIAC MARKERS:      ECG: AV paced

## 2023-03-25 NOTE — DISCHARGE NOTE PROVIDER - NSDCCPCAREPLAN_GEN_ALL_CORE_FT
PRINCIPAL DISCHARGE DIAGNOSIS  Diagnosis: S/P ICD (internal cardiac defibrillator) procedure  Assessment and Plan of Treatment: You underwent a lead placement revision with electrophyiology. There have been no changes to your medication. Please follow up with Dr. Almendarez within 2 weeks.  Please monitor the incision site. If you notice increased swelling, discharge, increased redness around the site, fevers, chills or other infectious symptoms please contact your physician right away.   Please do not lift your arm greater than 45 degrees for the next 7-10 days.   You can take tylenol for the pain.

## 2023-03-25 NOTE — DISCHARGE NOTE PROVIDER - EXTENDED VTE YES NO FOR MLM ENOXAPARIN
To confirm, Your doctor has instructed you that surgery is scheduled for: 6/24/2019    Please report to [facility], Registration the morning of surgery. You must check-in and receive a wristband before going to your procedure.    Pre-Op will call the FRIDAY prior to surgery between 1:00 and 6:00 PM with the final arrival time.  Phone number: [phone]    PLEASE NOTE:  The surgery schedule has many variables which may affect the time of your surgery case.  Family members should be available if your surgery time changes.  Plan to be here the day of your procedure between 4-6 hours.    MEDICATIONS:  TAKE ONLY THESE MEDICATIONS WITH A SMALL SIP OF WATER THE MORNING OF YOUR PROCEDURE:  ADVAIR, LEVOTHYROXINE, CHANTIX      DO NOT TAKE THESE MEDICATIONS 5-7 DAYS PRIOR to your procedure or per your surgeon's request: ASPIRIN, ALEVE, ADVIL, IBUPROFEN, FISH OIL VITAMIN E, HERBALS, FLAXSEED OIL  (May take Tylenol)    ONLY if you are prescribed any types of blood thinners such as:  Aspirin, Coumadin, Plavix, Pradaxa, Xarelto, Aggrenox, Effient, Eliquis, Savasya, Brilinta, or any other, ask your surgeon whether you should stop taking them and how long before surgery you should stop.  You may also need to verify with the prescribing physician if it is ok to stop your medication.      INSTRUCTIONS IMPORTANT!!  · Do not eat or drink anything between midnight and the time of your procedure- this includes gum, mints, and candy.  · Do not smoke or drink alcoholic beverages 24 hours prior to your procedure.  · Shower the night before AND the morning of your procedure with a Chlorhexidine wash such as Hibiclens or Dial antibacterial soap from the neck down.  Do not get it on your face or in your eyes.  You may use your own shampoo and face wash. This helps your skin to be as bacteria free as possible.    · If you wear contact lenses, dentures, hearing aids or glasses, bring a container to put them in during  surgery and give to a family member for safe keeping.  Please leave all jewelry, piercing's and valuables at home.   · DO NOT remove hair from the surgery site.  Do not shave the incision site unless you are given specific instructions to do so.    · ONLY if you have been diagnosed with sleep apnea please bring your C-PAP machine.  · ONLY if you wear home oxygen please bring your portable oxygen tank the day of your procedure.  · ONLY if you have a history of OPEN HEART SURGERY you will need a clearance from your Cardiologist per Anesthesia.      · ONLY for patients requiring bowel prep, written instructions will be given by your doctor's office.  · ONLY if you have a neuro stimulator, please bring the controller with you the morning of surgery  · ONLY if a type and screen test is needed before surgery, please return:N/A  · If your doctor has scheduled you for an overnight stay, bring a small overnight bag with any personal items you need.  · Make arrangements in advance for transportation home by a responsible adult.  It is not safe to drive a vehicle during the 24 hours after anesthesia.      · Visiting hours are 10:00AM to 8:30PM.  For the safety of all patients, visitors under the age of 12 are not allowed above the first floor of the hospital.    · All [facility] facilities and properties are tobacco free.  Smoking is NOT allowed.       If you have any questions about these instructions, call Pre-Op Admit  Nursing at [phone] or the Pre-Op Day Surgery Unit at [phone].       ,

## 2023-03-25 NOTE — DISCHARGE NOTE PROVIDER - NSDCQMERRANDS_GEN_ALL_CORE
Hemostasis: None Post-Care Instructions: I reviewed with the patient in detail post-care instructions. Patient is to keep the biopsy site dry overnight, and then apply Vaseline twice daily until healed. Patient may apply hydrogen peroxide soaks to remove any crusting. Suture Removal: 14 days No Punch Size In Mm: 4 Billing Type: Third-Party Bill Validate That Anesthesia Is Not 0: No Biopsy Type: H and E Detail Level: Simple Anesthesia Type: 1% lidocaine with epinephrine Wound Care: Vaseline X Depth Of Punch In Cm (Optional): 0 Was A Bandage Applied: Yes Dressing: bandage Epidermal Sutures: 4-0 Nylon Anesthesia Volume In Cc: 0.5 Notification Instructions: Patient will be notified of biopsy results. However, patient instructed to call the office if not contacted within 2 weeks. Information: Selecting Yes will display possible errors in your note based on the variables you have selected. This validation is only offered as a suggestion for you. PLEASE NOTE THAT THE VALIDATION TEXT WILL BE REMOVED WHEN YOU FINALIZE YOUR NOTE. IF YOU WANT TO FAX A PRELIMINARY NOTE YOU WILL NEED TO TOGGLE THIS TO 'NO' IF YOU DO NOT WANT IT IN YOUR FAXED NOTE. Consent: Written consent was obtained and risks were reviewed including but not limited to scarring, infection, bleeding, scabbing, incomplete removal, nerve damage and allergy to anesthesia.

## 2023-03-25 NOTE — PROGRESS NOTE ADULT - ASSESSMENT
82 year old man with a history of HFpEF (60-65%), syncope s/p ICD, HCM, HTN, HLD who presents for scheduled ICD generator change and possible atrial lead revision. Now s/p atrial lead implant    SND s/p atrial lead implant  Now s/p addition of atrial lead, capping of prior failing atrial lead  Doing well, no post op complications  CXR reviewed  Plan;  - discharge home today with outpatient follow up

## 2023-03-25 NOTE — DISCHARGE NOTE PROVIDER - NSDCFUSCHEDAPPT_GEN_ALL_CORE_FT
[facility] Physician [facility]  [address]  Scheduled Appointment: 06/05/2023

## 2023-03-25 NOTE — DOWNTIME INTERRUPTION NOTE - WHICH MANUAL FORMS INITIATED?
See paper records for clinical information written during SCM downtime. Discharge paperwork included in paper chart.

## 2023-03-25 NOTE — DISCHARGE NOTE NURSING/CASE MANAGEMENT/SOCIAL WORK - PATIENT PORTAL LINK FT
You can access the FollowMyHealth Patient Portal offered by [facility] by registering at the following website: [email or web address]. By joining [facility]’s FollowMyHealth portal, you will also be able to view your health information using other applications (apps) compatible with our system.

## 2023-03-27 PROBLEM — R55 SYNCOPE AND COLLAPSE: Chronic | Status: ACTIVE | Noted: 2023-03-24

## 2023-03-27 PROBLEM — I10 ESSENTIAL (PRIMARY) HYPERTENSION: Chronic | Status: ACTIVE | Noted: 2023-03-24

## 2023-03-27 PROBLEM — Z86.79 PERSONAL HISTORY OF OTHER DISEASES OF THE CIRCULATORY SYSTEM: Chronic | Status: ACTIVE | Noted: 2023-03-24

## 2023-03-27 PROBLEM — E78.5 HYPERLIPIDEMIA, UNSPECIFIED: Chronic | Status: ACTIVE | Noted: 2023-03-24

## 2023-03-28 NOTE — DISCUSSION/SUMMARY
[AICD Function Normal] : normal AICD function [FreeTextEntry1] : Mr. Feliz is a pleasant 82 year old male with HFpEF (60-65%), syncope s/p ICD, HCM, HTN, HLD, who presents for device check. His device is functioning normally and all measured data is within normal limits. He has a history of hypertrophic cardiomyopathy with no recent syncope and no significant ectopy seen on his device. His device is at RRT and we discussed it is time to schedule a generator change.  He historically has had noise on RA lead with slightly increased impedance trends. We discussed options of an extraction (remove whole device and place a new device).  This would give him a MRI compatible system but we discussed the potential risks associated with this procedure.  ANother option is to abandon the RA lead which is the least involved approach but he would never have a MRI compatible decision.  The last option is to leave this wire in which is currently working and if it fails consider the other two options.  While this is the least involved approach we discussed that this may lead to a second procedure.  After talking the pros/cons of each option he would like to proceed with a venogram and if the  vein is open cap his current RA lead and place a new one.  If not we will just leave the current lead in and see how it does over time.  We discussed the procedure in detail including risks, benefits, and alternatives.  We discussed procedural risks--including but not limited to bleeding/pocket hematoma, phlebitis/thrombophlebitis, lead dislodgment, PPM and/or lead infection, device malfunction, implantation site discomfort, pneumothorax, hemothorax, pericardial bleed/tamponade, anaphylaxis, air embolism, infection, skin erosion, lead fracture, lead dislodgement, pectoral muscle stimulation, intercostal or diaphragmatic pacing, vascular thrombosis, endocarditis or need for emergent surgery..  He will call with a date and knows to call with any questions or concerns.  \par

## 2023-03-28 NOTE — ADDENDUM
[FreeTextEntry1] : I, Clemente Almendarez, hereby attest that the medical record entry for this patient accurately reflects signatures/notations that I made on the Date of Service in my capacity as an Attending Physician when I treated/diagnosed the above patient. I do hereby attest that this information is true, accurate and complete to the best of my knowledge and I understand that any falsification, omission, or concealment of material fact may subject me to administrative, civil, or, criminal liability. I agree with the note as written by my PA in its entirety.\par I was present for the entire visit and supervised the entire visit and agree with the plan as outlined.\par \par \par I, Sool Larry, am scribing for and the presence of Dr. Almendarez the following sections: HPI, PMH,Family/social history, ROS, Physical Exam, Assessment / Plan.\par \par \par

## 2023-03-28 NOTE — HISTORY OF PRESENT ILLNESS
[None] : The patient complains of no symptoms [de-identified] : Mr. Feliz is an 82 year old man with a history of HFpEF (60-65%), syncope s/p ICD, HCM, HTN, HLD who presents for a device follow-up.\par \par  He reports no device related complaints. His genetic testing did not return positive for any genes associated with HCM. 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  He presents to see if his device generator needs to be changed.    No palpitations, chest pain, SOB, syncope, orthopnea, edema\par

## 2023-03-28 NOTE — PROCEDURE
[No] : not [Sinus Bradycardia] : sinus bradycardia [ICD] : Implantable cardioverter-defibrillator [DDD] : DDD [Lead Imp:  ___ohms] : lead impedance was [unfilled] ohms [Sensing Amplitude ___mv] : sensing amplitude was [unfilled] mv [___V @] : [unfilled] V [___ ms] : [unfilled] ms [Threshold Testing Performed] : Threshold testing was performed [de-identified] : [facility] [de-identified] : [employer] [de-identified] : [id number] [de-identified] : 04/22/2011 [de-identified] :  [de-identified] : RRT 2/22/23 [de-identified] : RV Shock impedance: 51 ohms [de-identified] : A paced 80%\par V paced 23%

## 2023-03-28 NOTE — PHYSICAL EXAM
[General Appearance - Well Developed] : well developed [Normal Appearance] : normal appearance [General Appearance - Well Nourished] : well nourished [Heart Rate And Rhythm] : heart rate and rhythm were normal [Heart Sounds] : normal S1 and S2 [] : no respiratory distress [Respiration, Rhythm And Depth] : normal respiratory rhythm and effort [Exaggerated Use Of Accessory Muscles For Inspiration] : no accessory muscle use [Auscultation Breath Sounds / Voice Sounds] : lungs were clear to auscultation bilaterally [Left Infraclavicular] : left infraclavicular area [Clean] : clean [Dry] : dry [Well-Healed] : well-healed [Well Groomed] : well groomed [No Deformities] : no deformities [General Appearance - In No Acute Distress] : no acute distress [Palpable Crepitus] : no palpable crepitus [Bleeding] : no active bleeding [Foul Odor] : no foul smell [Purulent Drainage] : no purulent drainage [Serosanguineous Drainage] : no serosanquineous drainage [Serous Drainage] : no serous drainage [Erythema] : not erythematous [Warm] : not warm [Tender] : not tender [Indurated] : not indurated [Fluctuant] : not fluctuant
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
## 2023-03-29 DIAGNOSIS — I50.32 CHRONIC DIASTOLIC (CONGESTIVE) HEART FAILURE: ICD-10-CM

## 2023-03-29 DIAGNOSIS — Z88.5 ALLERGY STATUS TO NARCOTIC AGENT: ICD-10-CM

## 2023-03-29 DIAGNOSIS — T82.190A OTHER MECHANICAL COMPLICATION OF CARDIAC ELECTRODE, INITIAL ENCOUNTER: ICD-10-CM

## 2023-03-29 DIAGNOSIS — I11.0 HYPERTENSIVE HEART DISEASE WITH HEART FAILURE: ICD-10-CM

## 2023-03-29 DIAGNOSIS — Z90.49 ACQUIRED ABSENCE OF OTHER SPECIFIED PARTS OF DIGESTIVE TRACT: ICD-10-CM

## 2023-03-29 DIAGNOSIS — Z79.82 LONG TERM (CURRENT) USE OF ASPIRIN: ICD-10-CM
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
## 2023-03-29 DIAGNOSIS — Z79.51 LONG TERM (CURRENT) USE OF INHALED STEROIDS: ICD-10-CM

## 2023-03-29 DIAGNOSIS — I42.2 OTHER HYPERTROPHIC CARDIOMYOPATHY: ICD-10-CM

## 2023-03-29 DIAGNOSIS — E78.5 HYPERLIPIDEMIA, UNSPECIFIED: ICD-10-CM

## 2023-03-30 ENCOUNTER — NON-APPOINTMENT (OUTPATIENT)
Age: 83
End: 2023-03-30

## 2023-03-30 ENCOUNTER — APPOINTMENT (OUTPATIENT)
Dept: HEART AND VASCULAR | Facility: CLINIC | Age: 83
End: 2023-03-30
Payer: MEDICARE

## 2023-03-30 VITALS
BODY MASS INDEX: 23.38 KG/M2 | SYSTOLIC BLOOD PRESSURE: 168 MMHG | WEIGHT: 167 LBS | HEART RATE: 69 BPM | HEIGHT: 71 IN | DIASTOLIC BLOOD PRESSURE: 78 MMHG

## 2023-03-30 PROCEDURE — 93283 PRGRMG EVAL IMPLANTABLE DFB: CPT

## 2023-03-31 LAB
ISTAT INR: 1.1 — SIGNIFICANT CHANGE UP (ref 0.88–1.16)
ISTAT PT: 12.6 SEC — SIGNIFICANT CHANGE UP (ref 10–12.9)
ISTAT VENOUS BE: 4 MMOL/L — HIGH (ref -2–3)
ISTAT VENOUS GLUCOSE: 95 MG/DL — SIGNIFICANT CHANGE UP (ref 70–99)
ISTAT VENOUS HCO3: 31 MMOL/L — HIGH (ref 23–28)
ISTAT VENOUS HEMATOCRIT: 31 % — LOW (ref 39–50)
ISTAT VENOUS HEMOGLOBIN: 10.5 GM/DL — LOW (ref 13–17)
ISTAT VENOUS IONIZED CALCIUM: 1.35 MMOL/L — HIGH (ref 1.12–1.3)
ISTAT VENOUS PCO2: 57 MMHG — HIGH (ref 41–51)
ISTAT VENOUS PH: 7.34 — SIGNIFICANT CHANGE UP (ref 7.31–7.41)
ISTAT VENOUS PO2: <66 MMHG — LOW (ref 35–40)
ISTAT VENOUS POTASSIUM: 5.1 MMOL/L — SIGNIFICANT CHANGE UP (ref 3.5–5.3)
ISTAT VENOUS SO2: 21 % — SIGNIFICANT CHANGE UP
ISTAT VENOUS SODIUM: 142 MMOL/L — SIGNIFICANT CHANGE UP (ref 135–145)
ISTAT VENOUS TCO2: 33 MMOL/L — HIGH (ref 22–31)
POCT ISTAT CREATININE: 1.7 MG/DL — HIGH (ref 0.5–1.3)

## 2023-04-11 NOTE — REVIEW OF SYSTEMS
[Negative] : Heme/Lymph [Fever] : no fever [Weight Gain (___ Lbs)] : no recent weight gain [Chills] : no chills [Feeling Fatigued] : not feeling fatigued [Weight Loss (___ Lbs)] : no recent weight loss [SOB] : no shortness of breath [Dyspnea on exertion] : not dyspnea during exertion [Chest Discomfort] : no chest discomfort [Lower Ext Edema] : no extremity edema [Palpitations] : no palpitations [Syncope] : no syncope

## 2023-04-11 NOTE — HISTORY OF PRESENT ILLNESS
[None] : The patient complains of no symptoms [de-identified] : Mr. Feliz is an 82 year old man with a history of HFpEF (60-65%), syncope s/p ICD, HCM, HTN, HLD who presents for a device follow-up.\par \par  He reports no device related complaints. His genetic testing did not return positive for any genes associated with HCM. 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   No palpitations, chest pain, SOB, syncope, orthopnea, edema\par

## 2023-04-11 NOTE — PROCEDURE
[No] : not [Sinus Bradycardia] : sinus bradycardia [ICD] : Implantable cardioverter-defibrillator [DDD] : DDD [Threshold Testing Performed] : Threshold testing was performed [Lead Imp:  ___ohms] : lead impedance was [unfilled] ohms [Sensing Amplitude ___mv] : sensing amplitude was [unfilled] mv [___V @] : [unfilled] V [___ ms] : [unfilled] ms [de-identified] : [facility] [de-identified] : momentum [de-identified] : 2023 [de-identified] :  [de-identified] :  >10 years  [de-identified] : RV Shock impedance: 36 ohms [de-identified] : A paced 83%\par V paced 7%\par no events

## 2023-04-11 NOTE — DISCUSSION/SUMMARY
[AICD Function Normal] : normal AICD function [FreeTextEntry1] : Mr. Feliz is an 82 year old man with a history of HFpEF (60-65%), syncope s/p ICD, HCM, HTN, HLD who presents for a device follow-up.  Device incision is well healed.  Interrogation reveals normal function and all measured data is within normal limits.  He will follow up in 6 months or sooner if needed and knows to call with any questions or concerns.  \par \par He has an upcoming rectal surgery.  He is cleared from an EP perspective to proceed with his surgery.  The device is unlikely to require lizzy-procedural reprogramming given the nature of the upcoming procedure.  However, if bipolar cautery is to be used within 6 inches of the device, please apply a magnet to the device.  This will inhibit ICD therapies until the magnet is removed. Attempt to avoid the use of monopolar cautery.  Any questions please call [phone]\par \par

## 2023-04-24 ENCOUNTER — NON-APPOINTMENT (OUTPATIENT)
Age: 83
End: 2023-04-24

## 2023-04-24 ENCOUNTER — APPOINTMENT (OUTPATIENT)
Dept: HEART AND VASCULAR | Facility: CLINIC | Age: 83
End: 2023-04-24
Payer: MEDICARE

## 2023-04-24 PROCEDURE — 93297 REM INTERROG DEV EVAL ICPMS: CPT

## 2023-04-24 PROCEDURE — G2066: CPT

## 2023-06-05 ENCOUNTER — APPOINTMENT (OUTPATIENT)
Dept: HEART AND VASCULAR | Facility: CLINIC | Age: 83
End: 2023-06-05
Payer: MEDICARE

## 2023-06-05 ENCOUNTER — NON-APPOINTMENT (OUTPATIENT)
Age: 83
End: 2023-06-05

## 2023-06-05 PROCEDURE — 93297 REM INTERROG DEV EVAL ICPMS: CPT

## 2023-06-05 PROCEDURE — G2066: CPT

## 2023-07-10 ENCOUNTER — NON-APPOINTMENT (OUTPATIENT)
Age: 83
End: 2023-07-10

## 2023-07-10 ENCOUNTER — APPOINTMENT (OUTPATIENT)
Dept: HEART AND VASCULAR | Facility: CLINIC | Age: 83
End: 2023-07-10
Payer: MEDICARE

## 2023-07-10 PROCEDURE — 93295 DEV INTERROG REMOTE 1/2/MLT: CPT

## 2023-07-10 PROCEDURE — 93296 REM INTERROG EVL PM/IDS: CPT

## 2023-08-09 ENCOUNTER — NON-APPOINTMENT (OUTPATIENT)
Age: 83
End: 2023-08-09

## 2023-08-09 ENCOUNTER — APPOINTMENT (OUTPATIENT)
Dept: HEART AND VASCULAR | Facility: CLINIC | Age: 83
End: 2023-08-09
Payer: MEDICARE

## 2023-08-09 PROCEDURE — 93297 REM INTERROG DEV EVAL ICPMS: CPT

## 2023-08-09 PROCEDURE — G2066: CPT

## 2023-09-11 ENCOUNTER — NON-APPOINTMENT (OUTPATIENT)
Age: 83
End: 2023-09-11

## 2023-09-11 ENCOUNTER — APPOINTMENT (OUTPATIENT)
Dept: HEART AND VASCULAR | Facility: CLINIC | Age: 83
End: 2023-09-11
Payer: MEDICARE

## 2023-09-11 PROCEDURE — G2066: CPT | Mod: NC

## 2023-09-11 PROCEDURE — 93297 REM INTERROG DEV EVAL ICPMS: CPT

## 2023-09-11 PROCEDURE — 93295 DEV INTERROG REMOTE 1/2/MLT: CPT

## 2023-09-11 PROCEDURE — 93296 REM INTERROG EVL PM/IDS: CPT

## 2023-10-26 ENCOUNTER — APPOINTMENT (OUTPATIENT)
Dept: HEART AND VASCULAR | Facility: CLINIC | Age: 83
End: 2023-10-26
Payer: MEDICARE

## 2023-10-26 VITALS
WEIGHT: 167 LBS | DIASTOLIC BLOOD PRESSURE: 51 MMHG | HEIGHT: 71 IN | SYSTOLIC BLOOD PRESSURE: 104 MMHG | BODY MASS INDEX: 23.38 KG/M2 | HEART RATE: 60 BPM

## 2023-10-26 PROCEDURE — 93283 PRGRMG EVAL IMPLANTABLE DFB: CPT

## 2023-12-11 ENCOUNTER — NON-APPOINTMENT (OUTPATIENT)
Age: 83
End: 2023-12-11

## 2023-12-11 ENCOUNTER — APPOINTMENT (OUTPATIENT)
Dept: HEART AND VASCULAR | Facility: CLINIC | Age: 83
End: 2023-12-11
Payer: MEDICARE

## 2023-12-11 PROCEDURE — G2066: CPT

## 2023-12-11 PROCEDURE — 93297 REM INTERROG DEV EVAL ICPMS: CPT

## 2024-01-11 ENCOUNTER — APPOINTMENT (OUTPATIENT)
Dept: HEART AND VASCULAR | Facility: CLINIC | Age: 84
End: 2024-01-11
Payer: MEDICARE

## 2024-01-11 ENCOUNTER — NON-APPOINTMENT (OUTPATIENT)
Age: 84
End: 2024-01-11

## 2024-01-11 PROCEDURE — 93297 REM INTERROG DEV EVAL ICPMS: CPT

## 2024-01-23 ENCOUNTER — RX ONLY (RX ONLY)
Age: 84
End: 2024-01-23

## 2024-01-23 ENCOUNTER — DOCTOR'S OFFICE (OUTPATIENT)
Dept: URBAN - METROPOLITAN AREA CLINIC 162 | Facility: CLINIC | Age: 84
Setting detail: OPHTHALMOLOGY
End: 2024-01-23
Payer: MEDICARE

## 2024-01-23 DIAGNOSIS — Z94.7: ICD-10-CM

## 2024-01-23 DIAGNOSIS — H35.3132: ICD-10-CM

## 2024-01-23 DIAGNOSIS — H43.813: ICD-10-CM

## 2024-01-23 DIAGNOSIS — H52.4: ICD-10-CM

## 2024-01-23 DIAGNOSIS — Z96.1: ICD-10-CM

## 2024-01-23 DIAGNOSIS — H18.511: ICD-10-CM

## 2024-01-23 PROCEDURE — 92004 COMPRE OPH EXAM NEW PT 1/>: CPT | Performed by: OPHTHALMOLOGY

## 2024-01-23 PROCEDURE — 92250 FUNDUS PHOTOGRAPHY W/I&R: CPT | Performed by: OPHTHALMOLOGY

## 2024-01-23 PROCEDURE — 92015 DETERMINE REFRACTIVE STATE: CPT | Performed by: OPHTHALMOLOGY

## 2024-01-23 ASSESSMENT — CONFRONTATIONAL VISUAL FIELD TEST (CVF)
OS_FINDINGS: FULL
OD_FINDINGS: FULL

## 2024-01-23 ASSESSMENT — REFRACTION_CURRENTRX
OS_SPHERE: +2.50
OD_OVR_VA: 20/
OS_AXIS: 119
OS_ADD: +2.75
OD_SPHERE: +1.00
OS_VPRISM_DIRECTION: BF
OD_AXIS: 099
OS_OVR_VA: 20/
OD_CYLINDER: -1.75
OD_ADD: +2.75
OD_VPRISM_DIRECTION: BF
OS_CYLINDER: -2.25

## 2024-01-23 ASSESSMENT — REFRACTION_MANIFEST
OS_SPHERE: +1.50
OD_CYLINDER: -2.00
OS_AXIS: 120
OD_AXIS: 090
OS_VA1: 20/150
OS_CYLINDER: -1.50
OD_VA1: 20/25+
OS_ADD: +2.75
OD_SPHERE: +1.25
OD_ADD: +2.75

## 2024-01-23 ASSESSMENT — SPHEQUIV_DERIVED
OS_SPHEQUIV: 0.75
OD_SPHEQUIV: 0.25
OS_SPHEQUIV: -0.5
OD_SPHEQUIV: 0.125

## 2024-01-23 ASSESSMENT — CORNEAL DYSTROPHY - POSTERIOR: OD_POSTERIORDYSTROPHY: 1+ 2+ GUTTATA

## 2024-01-23 ASSESSMENT — REFRACTION_AUTOREFRACTION
OD_SPHERE: +1.00
OS_CYLINDER: -1.00
OD_AXIS: 089
OD_CYLINDER: -1.75
OS_AXIS: 089
OS_SPHERE: 0.00

## 2024-02-15 ENCOUNTER — APPOINTMENT (OUTPATIENT)
Dept: HEART AND VASCULAR | Facility: CLINIC | Age: 84
End: 2024-02-15
Payer: MEDICARE

## 2024-02-15 ENCOUNTER — NON-APPOINTMENT (OUTPATIENT)
Age: 84
End: 2024-02-15

## 2024-02-15 PROCEDURE — 93295 DEV INTERROG REMOTE 1/2/MLT: CPT

## 2024-02-15 PROCEDURE — 93296 REM INTERROG EVL PM/IDS: CPT

## 2024-03-12 ENCOUNTER — DOCTOR'S OFFICE (OUTPATIENT)
Dept: URBAN - METROPOLITAN AREA CLINIC 162 | Facility: CLINIC | Age: 84
Setting detail: OPHTHALMOLOGY
End: 2024-03-12
Payer: MEDICARE

## 2024-03-12 ENCOUNTER — RX ONLY (RX ONLY)
Age: 84
End: 2024-03-12

## 2024-03-12 DIAGNOSIS — H35.3132: ICD-10-CM

## 2024-03-12 PROCEDURE — 92134 CPTRZ OPH DX IMG PST SGM RTA: CPT | Performed by: OPHTHALMOLOGY

## 2024-03-12 PROCEDURE — 92012 INTRM OPH EXAM EST PATIENT: CPT | Performed by: OPHTHALMOLOGY

## 2024-03-12 ASSESSMENT — SPHEQUIV_DERIVED
OD_SPHEQUIV: 0.25
OS_SPHEQUIV: 0.75

## 2024-03-12 ASSESSMENT — REFRACTION_MANIFEST
OD_VA1: 20/25+
OS_CYLINDER: -1.50
OS_VA1: 20/150
OD_CYLINDER: -2.00
OD_SPHERE: +1.25
OD_AXIS: 090
OD_ADD: +2.75
OS_AXIS: 120
OS_ADD: +2.75
OS_SPHERE: +1.50

## 2024-03-12 ASSESSMENT — REFRACTION_CURRENTRX
OD_AXIS: 099
OS_VPRISM_DIRECTION: BF
OS_ADD: +2.75
OD_OVR_VA: 20/
OD_ADD: +2.75
OS_AXIS: 119
OD_CYLINDER: -1.75
OD_VPRISM_DIRECTION: BF
OS_CYLINDER: -2.25
OS_SPHERE: +2.50
OS_OVR_VA: 20/
OD_SPHERE: +1.00

## 2024-03-20 ENCOUNTER — NON-APPOINTMENT (OUTPATIENT)
Age: 84
End: 2024-03-20

## 2024-03-21 ENCOUNTER — APPOINTMENT (OUTPATIENT)
Dept: HEART AND VASCULAR | Facility: CLINIC | Age: 84
End: 2024-03-21
Payer: MEDICARE

## 2024-03-21 PROCEDURE — 93297 REM INTERROG DEV EVAL ICPMS: CPT

## 2024-04-25 ENCOUNTER — APPOINTMENT (OUTPATIENT)
Dept: HEART AND VASCULAR | Facility: CLINIC | Age: 84
End: 2024-04-25
Payer: MEDICARE

## 2024-04-25 VITALS
TEMPERATURE: 95.5 F | BODY MASS INDEX: 23.62 KG/M2 | DIASTOLIC BLOOD PRESSURE: 71 MMHG | HEIGHT: 70 IN | SYSTOLIC BLOOD PRESSURE: 163 MMHG | OXYGEN SATURATION: 97 % | HEART RATE: 82 BPM | WEIGHT: 165 LBS

## 2024-04-25 DIAGNOSIS — I42.2 OTHER HYPERTROPHIC CARDIOMYOPATHY: ICD-10-CM

## 2024-04-25 PROCEDURE — 93283 PRGRMG EVAL IMPLANTABLE DFB: CPT

## 2024-04-28 NOTE — ADDENDUM
[FreeTextEntry1] : I, Clemente Almendarez, hereby attest that the medical record entry for this patient accurately reflects signatures/notations that I made on the Date of Service in my capacity as an Attending Physician when I treated/diagnosed the above patient. I do hereby attest that this information is true, accurate and complete to the best of my knowledge and I understand that any falsification, omission, or concealment of material fact may subject me to administrative, civil, or, criminal liability. I agree with the note as written by my PA in its entirety. I was present for the entire visit and supervised the entire visit and agree with the plan as outlined.   I, Solo Larry, am scribing for and the presence of Dr. Almendarez the following sections: HPI, PMH,Family/social history, ROS, Physical Exam, Assessment / Plan.

## 2024-04-28 NOTE — DISCUSSION/SUMMARY
[AICD Function Normal] : normal AICD function [FreeTextEntry1] : Mr. Feliz is an 83 year old man with a history of HFpEF (60-65%), syncope s/p ICD, HCM, HTN, HLD who presents for a device follow-up.  Device interrogation reveals normal function and all measured data is within normal limits.  He will follow up in 6 months or sooner if needed and knows to call with any questions or concerns in the interim.

## 2024-04-28 NOTE — PROCEDURE
[No] : not [Sinus Bradycardia] : sinus bradycardia [ICD] : Implantable cardioverter-defibrillator [DDD] : DDD [Threshold Testing Performed] : Threshold testing was performed [Lead Imp:  ___ohms] : lead impedance was [unfilled] ohms [Sensing Amplitude ___mv] : sensing amplitude was [unfilled] mv [___V @] : [unfilled] V [___ ms] : [unfilled] ms [de-identified] : [facility] [de-identified] : momentum [de-identified] : 2023 [de-identified] :  [de-identified] :  >10 years  [de-identified] :  lowered atrial output [de-identified] : A paced 67% V paced 3% no events

## 2024-04-28 NOTE — REVIEW OF SYSTEMS
[Weight Gain (___ Lbs)] : no recent weight gain [Weight Loss (___ Lbs)] : no recent weight loss [Fever] : no fever [Chills] : no chills [Feeling Fatigued] : not feeling fatigued [SOB] : no shortness of breath [Dyspnea on exertion] : not dyspnea during exertion [Chest Discomfort] : no chest discomfort [Lower Ext Edema] : no extremity edema [Palpitations] : no palpitations [Orthopnea] : no orthopnea [Syncope] : no syncope [Negative] : Psychiatric

## 2024-04-28 NOTE — PHYSICAL EXAM
[General Appearance - Well Developed] : well developed [Normal Appearance] : normal appearance [Well Groomed] : well groomed [General Appearance - Well Nourished] : well nourished [No Deformities] : no deformities [General Appearance - In No Acute Distress] : no acute distress [Heart Rate And Rhythm] : heart rate and rhythm were normal [Heart Sounds] : normal S1 and S2 [] : no respiratory distress [Respiration, Rhythm And Depth] : normal respiratory rhythm and effort [Exaggerated Use Of Accessory Muscles For Inspiration] : no accessory muscle use [Auscultation Breath Sounds / Voice Sounds] : lungs were clear to auscultation bilaterally [Left Infraclavicular] : left infraclavicular area [Clean] : clean [Dry] : dry [Well-Healed] : well-healed [Serosanguineous Drainage] : no serosanquineous drainage [Edema] : no peripheral edema present [Palpable Crepitus] : no palpable crepitus [Bleeding] : no active bleeding [Foul Odor] : no foul smell [Purulent Drainage] : no purulent drainage [Serous Drainage] : no serous drainage [Erythema] : not erythematous [Warm] : not warm [Tender] : not tender [Indurated] : not indurated [Fluctuant] : not fluctuant

## 2024-04-28 NOTE — HISTORY OF PRESENT ILLNESS
[None] : The patient complains of no symptoms [de-identified] : Mr. Feliz is an 83 year old man with a history of HFpEF (60-65%), syncope s/p ICD, HCM, HTN, HLD who presents for a device follow-up.   He reports no device related complaints. His genetic testing did not return positive for any genes associated with HCM. 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  He is s/p ICD generator change 2023 with placement of new RA lead.   He presents for routine follow up and has no complaints.  No palpitations, chest pain, SOB, syncope, orthopnea, edema.  No device related issues.

## 2024-05-27 ENCOUNTER — NON-APPOINTMENT (OUTPATIENT)
Age: 84
End: 2024-05-27

## 2024-05-28 ENCOUNTER — APPOINTMENT (OUTPATIENT)
Dept: HEART AND VASCULAR | Facility: CLINIC | Age: 84
End: 2024-05-28
Payer: MEDICARE

## 2024-05-28 PROCEDURE — 93297 REM INTERROG DEV EVAL ICPMS: CPT

## 2024-06-30 ENCOUNTER — NON-APPOINTMENT (OUTPATIENT)
Age: 84
End: 2024-06-30

## 2024-07-01 PROCEDURE — 93295 DEV INTERROG REMOTE 1/2/MLT: CPT

## 2024-07-01 PROCEDURE — 93296 REM INTERROG EVL PM/IDS: CPT

## 2024-07-02 ENCOUNTER — APPOINTMENT (OUTPATIENT)
Dept: HEART AND VASCULAR | Facility: CLINIC | Age: 84
End: 2024-07-02
Payer: MEDICARE

## 2024-08-01 ENCOUNTER — NON-APPOINTMENT (OUTPATIENT)
Age: 84
End: 2024-08-01

## 2024-08-02 ENCOUNTER — APPOINTMENT (OUTPATIENT)
Dept: HEART AND VASCULAR | Facility: CLINIC | Age: 84
End: 2024-08-02
Payer: MEDICARE

## 2024-08-02 PROCEDURE — 93297 REM INTERROG DEV EVAL ICPMS: CPT

## 2024-09-06 ENCOUNTER — NON-APPOINTMENT (OUTPATIENT)
Age: 84
End: 2024-09-06

## 2024-09-06 ENCOUNTER — APPOINTMENT (OUTPATIENT)
Dept: HEART AND VASCULAR | Facility: CLINIC | Age: 84
End: 2024-09-06
Payer: MEDICARE

## 2024-09-06 PROCEDURE — 93297 REM INTERROG DEV EVAL ICPMS: CPT

## 2024-09-17 ENCOUNTER — DOCTOR'S OFFICE (OUTPATIENT)
Dept: URBAN - METROPOLITAN AREA CLINIC 162 | Facility: CLINIC | Age: 84
Setting detail: OPHTHALMOLOGY
End: 2024-09-17
Payer: MEDICARE

## 2024-09-17 DIAGNOSIS — H35.3132: ICD-10-CM

## 2024-09-17 DIAGNOSIS — Z94.7: ICD-10-CM

## 2024-09-17 DIAGNOSIS — H18.511: ICD-10-CM

## 2024-09-17 DIAGNOSIS — H00.021: ICD-10-CM

## 2024-09-17 DIAGNOSIS — H04.123: ICD-10-CM

## 2024-09-17 DIAGNOSIS — Z96.1: ICD-10-CM

## 2024-09-17 DIAGNOSIS — H00.024: ICD-10-CM

## 2024-09-17 PROCEDURE — 92012 INTRM OPH EXAM EST PATIENT: CPT | Performed by: OPHTHALMOLOGY

## 2024-09-17 ASSESSMENT — CORNEAL DYSTROPHY - POSTERIOR: OD_POSTERIORDYSTROPHY: 1+ 2+ GUTTATA

## 2024-09-17 ASSESSMENT — REFRACTION_CURRENTRX
OD_CYLINDER: -1.75
OS_OVR_VA: 20/
OD_ADD: +2.75
OD_VPRISM_DIRECTION: BF
OS_VPRISM_DIRECTION: BF
OD_OVR_VA: 20/
OD_AXIS: 099
OS_ADD: +2.75
OS_SPHERE: +2.50
OD_SPHERE: +1.00
OS_AXIS: 119
OS_CYLINDER: -2.25

## 2024-09-17 ASSESSMENT — REFRACTION_AUTOREFRACTION
OS_SPHERE: 0.00
OS_CYLINDER: -1.00
OD_AXIS: 089
OS_AXIS: 089
OD_CYLINDER: -1.75
OD_SPHERE: +1.00

## 2024-09-17 ASSESSMENT — REFRACTION_MANIFEST
OS_VA1: 20/150
OD_VA1: 20/25+
OD_SPHERE: +1.25
OD_CYLINDER: -2.00
OS_CYLINDER: -1.50
OS_AXIS: 120
OS_SPHERE: +1.50
OD_AXIS: 090
OS_ADD: +2.75
OD_ADD: +2.75

## 2024-09-17 ASSESSMENT — LID EXAM ASSESSMENTS
OS_MEIBOMITIS: 2+
OD_COMMENTS: 1+ AR
OS_COMMENTS: 1+ AR
OD_MEIBOMITIS: 2+

## 2024-09-17 ASSESSMENT — PUNCTA - ASSESSMENT
OD_PUNCTA: SMALL
OS_PUNCTA: SMALL

## 2024-09-17 ASSESSMENT — KERATOMETRY
OS_K2POWER_DIOPTERS: 44.25
OS_K1POWER_DIOPTERS: 42.25
OD_K1POWER_DIOPTERS: 44.00
OS_AXISANGLE_DEGREES: 023
OD_AXISANGLE_DEGREES: 174
OD_K2POWER_DIOPTERS: 45.25

## 2024-09-17 ASSESSMENT — SUPERFICIAL PUNCTATE KERATITIS (SPK): OS_SPK: 2+

## 2024-09-17 ASSESSMENT — VISUAL ACUITY
OD_BCVA: 20/300
OS_BCVA: 20/30

## 2024-09-17 ASSESSMENT — DECREASING TEAR LAKE - SEVERITY SCORE
OD_DEC_TEARLAKE: 2+
OS_DEC_TEARLAKE: 2+

## 2024-09-17 ASSESSMENT — TEAR BREAK UP TIME (TBUT): OS_TBUT: 2+

## 2024-10-16 ENCOUNTER — APPOINTMENT (OUTPATIENT)
Dept: HEART AND VASCULAR | Facility: CLINIC | Age: 84
End: 2024-10-16

## 2024-10-16 ENCOUNTER — NON-APPOINTMENT (OUTPATIENT)
Age: 84
End: 2024-10-16

## 2024-10-16 VITALS
HEIGHT: 70 IN | BODY MASS INDEX: 23.48 KG/M2 | TEMPERATURE: 97.6 F | WEIGHT: 164 LBS | DIASTOLIC BLOOD PRESSURE: 84 MMHG | HEART RATE: 59 BPM | SYSTOLIC BLOOD PRESSURE: 172 MMHG

## 2024-10-16 PROCEDURE — 93283 PRGRMG EVAL IMPLANTABLE DFB: CPT

## 2024-11-18 ENCOUNTER — APPOINTMENT (OUTPATIENT)
Dept: HEART AND VASCULAR | Facility: CLINIC | Age: 84
End: 2024-11-18
Payer: MEDICARE

## 2024-11-18 ENCOUNTER — NON-APPOINTMENT (OUTPATIENT)
Age: 84
End: 2024-11-18

## 2024-11-18 PROCEDURE — 93297 REM INTERROG DEV EVAL ICPMS: CPT

## 2024-12-23 ENCOUNTER — NON-APPOINTMENT (OUTPATIENT)
Age: 84
End: 2024-12-23

## 2024-12-23 ENCOUNTER — APPOINTMENT (OUTPATIENT)
Dept: HEART AND VASCULAR | Facility: CLINIC | Age: 84
End: 2024-12-23
Payer: MEDICARE

## 2024-12-23 PROCEDURE — 93297 REM INTERROG DEV EVAL ICPMS: CPT

## 2025-01-27 ENCOUNTER — APPOINTMENT (OUTPATIENT)
Dept: HEART AND VASCULAR | Facility: CLINIC | Age: 85
End: 2025-01-27
Payer: MEDICARE

## 2025-01-27 ENCOUNTER — NON-APPOINTMENT (OUTPATIENT)
Age: 85
End: 2025-01-27

## 2025-01-27 PROCEDURE — 93296 REM INTERROG EVL PM/IDS: CPT

## 2025-01-27 PROCEDURE — 93295 DEV INTERROG REMOTE 1/2/MLT: CPT

## 2025-03-03 ENCOUNTER — APPOINTMENT (OUTPATIENT)
Dept: HEART AND VASCULAR | Facility: CLINIC | Age: 85
End: 2025-03-03
Payer: MEDICARE

## 2025-03-03 ENCOUNTER — NON-APPOINTMENT (OUTPATIENT)
Age: 85
End: 2025-03-03

## 2025-03-03 PROCEDURE — 93297 REM INTERROG DEV EVAL ICPMS: CPT

## 2025-03-26 ENCOUNTER — DOCTOR'S OFFICE (OUTPATIENT)
Dept: URBAN - METROPOLITAN AREA CLINIC 162 | Facility: CLINIC | Age: 85
Setting detail: OPHTHALMOLOGY
End: 2025-03-26
Payer: MEDICARE

## 2025-03-26 DIAGNOSIS — H00.021: ICD-10-CM

## 2025-03-26 DIAGNOSIS — H52.4: ICD-10-CM

## 2025-03-26 DIAGNOSIS — H35.3132: ICD-10-CM

## 2025-03-26 DIAGNOSIS — Z94.7: ICD-10-CM

## 2025-03-26 DIAGNOSIS — H18.511: ICD-10-CM

## 2025-03-26 PROCEDURE — 92014 COMPRE OPH EXAM EST PT 1/>: CPT | Performed by: OPHTHALMOLOGY

## 2025-03-26 PROCEDURE — 92250 FUNDUS PHOTOGRAPHY W/I&R: CPT | Performed by: OPHTHALMOLOGY

## 2025-03-26 PROCEDURE — 76514 ECHO EXAM OF EYE THICKNESS: CPT | Performed by: OPHTHALMOLOGY

## 2025-03-26 PROCEDURE — 92015 DETERMINE REFRACTIVE STATE: CPT | Performed by: OPHTHALMOLOGY

## 2025-03-26 ASSESSMENT — REFRACTION_MANIFEST
OD_AXIS: 100
OD_ADD: +3.00
OD_SPHERE: +1.00
OS_ADD: +3.00
OS_SPHERE: +2.50
OD_VA1: 20/30
OS_AXIS: 120
OD_CYLINDER: -1.75
OS_VA1: 20/200
OS_VA2: 20/40(J3)
OD_VA2: 20/40(J3)
OS_CYLINDER: -2.25

## 2025-03-26 ASSESSMENT — REFRACTION_CURRENTRX
OS_OVR_VA: 20/
OS_VPRISM_DIRECTION: BF
OD_SPHERE: +1.00
OD_AXIS: 101
OD_CYLINDER: -1.75
OD_OVR_VA: 20/
OS_ADD: +2.75
OS_SPHERE: +2.50
OS_AXIS: 117
OD_ADD: +2.75
OD_VPRISM_DIRECTION: BF
OS_CYLINDER: -2.25

## 2025-03-26 ASSESSMENT — LID EXAM ASSESSMENTS
OS_COMMENTS: 1+ AR
OS_MEIBOMITIS: 2+
OD_COMMENTS: 1+ AR
OD_MEIBOMITIS: 2+

## 2025-03-26 ASSESSMENT — REFRACTION_AUTOREFRACTION
OS_CYLINDER: -4.25
OS_AXIS: 096
OD_AXIS: 097
OD_CYLINDER: -1.50
OS_SPHERE: +3.25
OD_SPHERE: +0.75

## 2025-03-26 ASSESSMENT — TONOMETRY: OD_IOP_MMHG: 15

## 2025-03-26 ASSESSMENT — VISUAL ACUITY
OD_BCVA: 20/200
OS_BCVA: 20/30

## 2025-03-26 ASSESSMENT — CONFRONTATIONAL VISUAL FIELD TEST (CVF)
OD_FINDINGS: FULL
OS_FINDINGS: FULL

## 2025-03-26 ASSESSMENT — PUNCTA - ASSESSMENT
OS_PUNCTA: SMALL
OD_PUNCTA: SMALL

## 2025-03-26 ASSESSMENT — KERATOMETRY
OD_K2POWER_DIOPTERS: 45.25
OD_K1POWER_DIOPTERS: 44.00
OS_K1POWER_DIOPTERS: UNABLE
OD_AXISANGLE_DEGREES: 011

## 2025-03-26 ASSESSMENT — PACHYMETRY
OS_CT_UM: 643
OS_CT_CORRECTION: -7

## 2025-03-26 ASSESSMENT — DECREASING TEAR LAKE - SEVERITY SCORE
OD_DEC_TEARLAKE: 2+
OS_DEC_TEARLAKE: 2+

## 2025-03-26 ASSESSMENT — CORNEAL DYSTROPHY - POSTERIOR: OD_POSTERIORDYSTROPHY: 1+ 2+ GUTTATA

## 2025-03-26 ASSESSMENT — TEAR BREAK UP TIME (TBUT): OS_TBUT: 2+

## 2025-03-26 ASSESSMENT — SUPERFICIAL PUNCTATE KERATITIS (SPK): OS_SPK: 2+

## 2025-04-16 ENCOUNTER — NON-APPOINTMENT (OUTPATIENT)
Age: 85
End: 2025-04-16

## 2025-04-16 ENCOUNTER — APPOINTMENT (OUTPATIENT)
Dept: HEART AND VASCULAR | Facility: CLINIC | Age: 85
End: 2025-04-16
Payer: MEDICARE

## 2025-04-16 VITALS
HEART RATE: 60 BPM | HEIGHT: 70 IN | BODY MASS INDEX: 24.34 KG/M2 | WEIGHT: 170 LBS | TEMPERATURE: 98.1 F | SYSTOLIC BLOOD PRESSURE: 126 MMHG | OXYGEN SATURATION: 97 % | DIASTOLIC BLOOD PRESSURE: 74 MMHG

## 2025-04-16 PROCEDURE — 93283 PRGRMG EVAL IMPLANTABLE DFB: CPT

## 2025-04-16 PROCEDURE — 99212 OFFICE O/P EST SF 10 MIN: CPT | Mod: 25

## 2025-05-21 ENCOUNTER — APPOINTMENT (OUTPATIENT)
Dept: HEART AND VASCULAR | Facility: CLINIC | Age: 85
End: 2025-05-21

## 2025-06-27 ENCOUNTER — APPOINTMENT (OUTPATIENT)
Dept: HEART AND VASCULAR | Facility: CLINIC | Age: 85
End: 2025-06-27

## 2025-06-27 ENCOUNTER — NON-APPOINTMENT (OUTPATIENT)
Age: 85
End: 2025-06-27

## 2025-06-27 PROCEDURE — 93296 REM INTERROG EVL PM/IDS: CPT

## 2025-06-27 PROCEDURE — 93295 DEV INTERROG REMOTE 1/2/MLT: CPT

## 2025-08-01 ENCOUNTER — NON-APPOINTMENT (OUTPATIENT)
Age: 85
End: 2025-08-01

## 2025-08-01 ENCOUNTER — APPOINTMENT (OUTPATIENT)
Dept: HEART AND VASCULAR | Facility: CLINIC | Age: 85
End: 2025-08-01
Payer: MEDICARE

## 2025-08-01 PROCEDURE — 93297 REM INTERROG DEV EVAL ICPMS: CPT

## 2025-09-05 ENCOUNTER — APPOINTMENT (OUTPATIENT)
Dept: HEART AND VASCULAR | Facility: CLINIC | Age: 85
End: 2025-09-05
Payer: MEDICARE

## 2025-09-05 ENCOUNTER — NON-APPOINTMENT (OUTPATIENT)
Age: 85
End: 2025-09-05

## 2025-09-05 PROCEDURE — 93297 REM INTERROG DEV EVAL ICPMS: CPT
